# Patient Record
Sex: MALE | Race: WHITE | Employment: OTHER | URBAN - METROPOLITAN AREA
[De-identification: names, ages, dates, MRNs, and addresses within clinical notes are randomized per-mention and may not be internally consistent; named-entity substitution may affect disease eponyms.]

---

## 2017-04-06 ENCOUNTER — TELEPHONE (OUTPATIENT)
Dept: FAMILY MEDICINE | Facility: CLINIC | Age: 67
End: 2017-04-06

## 2017-04-06 DIAGNOSIS — R97.20 ELEVATED PROSTATE SPECIFIC ANTIGEN (PSA): Primary | ICD-10-CM

## 2017-04-06 NOTE — TELEPHONE ENCOUNTER
Reason for Call: Request for an order or referral:    Order or referral being requested: Labs PSA    Date needed: as soon as possible    Has the patient been seen by the PCP for this problem? YES    Additional comments: Patient was told to follow up in 3 months to have his PSA checked.  Patient's spouse is calling to see if the patient can get orders for a blood draw or does the patient need to see Dr Gaona?    Phone number Patient can be reached at:  [phone] (home)            Best Time:  Any    Can we leave a detailed message on this number?  YES    Call taken on 4/6/2017 at 7:24 AM by Ana Paula Vides

## 2017-04-06 NOTE — TELEPHONE ENCOUNTER
Called patient and spoke with his wife. Notified her of PSA lab placed. Patient will come in at 4 pm today.    Kaycee Brand RN  [facility]

## 2017-04-10 DIAGNOSIS — R97.20 ELEVATED PROSTATE SPECIFIC ANTIGEN (PSA): ICD-10-CM

## 2017-04-10 PROCEDURE — G0103 PSA SCREENING: HCPCS | Performed by: PHYSICIAN ASSISTANT

## 2017-04-11 LAB — PSA SERPL-ACNC: 5.54 UG/L (ref 0–4)

## 2017-04-11 NOTE — PROGRESS NOTES
Please call patient. Have him follow up in clinic with Dr. Gaona to discuss further.   Jeanine Little PA-C

## 2017-04-12 ENCOUNTER — TELEPHONE (OUTPATIENT)
Dept: FAMILY MEDICINE | Facility: CLINIC | Age: 67
End: 2017-04-12

## 2017-04-12 NOTE — TELEPHONE ENCOUNTER
PSA 5.54 (H) 0 - 4 ug/L Final 04/10/2017  4:06 PM 59     See message below per provider.    Called patient at [phone] (home) . Left message on voicemail to return phone call to triage.  Ni Weiss RN C[facility] Triage.

## 2017-04-12 NOTE — TELEPHONE ENCOUNTER
Called patient at [phone] (home) and notified him of message below from provider. Patient stated understanding and appointment has been made to see provider.    Kaycee Brand RN  [facility]

## 2017-04-12 NOTE — TELEPHONE ENCOUNTER
Patient returned call to RN line and left message for call back to him at cell [phone].  Jordana Ruiz RN  [facility] [place] [facility]

## 2017-04-21 ENCOUNTER — OFFICE VISIT (OUTPATIENT)
Dept: FAMILY MEDICINE | Facility: CLINIC | Age: 67
End: 2017-04-21
Payer: COMMERCIAL

## 2017-04-21 VITALS
SYSTOLIC BLOOD PRESSURE: 120 MMHG | WEIGHT: 211 LBS | HEIGHT: 69 IN | OXYGEN SATURATION: 96 % | DIASTOLIC BLOOD PRESSURE: 74 MMHG | TEMPERATURE: 97.5 F | BODY MASS INDEX: 31.25 KG/M2 | HEART RATE: 54 BPM

## 2017-04-21 DIAGNOSIS — E78.5 HYPERLIPIDEMIA LDL GOAL <130: ICD-10-CM

## 2017-04-21 DIAGNOSIS — I10 ESSENTIAL HYPERTENSION WITH GOAL BLOOD PRESSURE LESS THAN 140/90: ICD-10-CM

## 2017-04-21 DIAGNOSIS — R97.20 ELEVATED PROSTATE SPECIFIC ANTIGEN (PSA): Primary | ICD-10-CM

## 2017-04-21 PROCEDURE — 99213 OFFICE O/P EST LOW 20 MIN: CPT | Performed by: FAMILY MEDICINE

## 2017-04-21 RX ORDER — LISINOPRIL 20 MG/1
20 TABLET ORAL DAILY
Qty: 90 TABLET | Refills: 3 | Status: CANCELLED | OUTPATIENT
Start: 2017-04-21

## 2017-04-21 RX ORDER — ATORVASTATIN CALCIUM 10 MG/1
10 TABLET, FILM COATED ORAL DAILY
Qty: 90 TABLET | Refills: 3 | Status: CANCELLED | OUTPATIENT
Start: 2017-04-21

## 2017-04-21 RX ORDER — AMLODIPINE BESYLATE 5 MG/1
5 TABLET ORAL DAILY
Qty: 90 TABLET | Refills: 3 | Status: CANCELLED | OUTPATIENT
Start: 2017-04-21

## 2017-04-21 ASSESSMENT — PAIN SCALES - GENERAL: PAINLEVEL: NO PAIN (0)

## 2017-04-21 NOTE — PATIENT INSTRUCTIONS
Stay on same blood pressure meds    [facility] to schedule consult    Bring your labs to the consult

## 2017-04-21 NOTE — PROGRESS NOTES
SUBJECTIVE:                                                    Brian Stephenson is a 67 year old male who presents to clinic today for the following health issues:       Hypertension Follow-up      Outpatient blood pressures are not being checked.    Low Salt Diet: not monitoring salt       Amount of exercise or physical activity: None    Problems taking medications regularly: No    Medication side effects: none    Diet: regular (no restrictions)      Results of PSA test    Problem list and histories reviewed & adjusted, as indicated.  Additional history: as documented         Reviewed and updated as needed this visit by clinical staff       Reviewed and updated as needed this visit by Provider          blood pressure at home is fine, similar to our reading here      Urinating okay    Slight increase in frequency    Sleeps through night    Stream okay        Full physical not done     Mentation and affect are fine    No tremor of speech or extremity    Went over labs in great detail, past and more recent    ASSESSMENT / PLAN:  (R97.20) Elevated prostate specific antigen (PSA)  (primary encounter diagnosis)  Comment: discussed in detail.  He and I agreed that given the accelerating rise in psa, urology eval is appropriate.  [facility] since [facility] is a preferred hospital for his insurance.   Plan: UROLOGY ADULT REFERRAL        Likely he will have an office biopsy done.  Await results.     (I10) Essential hypertension with goal blood pressure less than 140/90  Comment: at goal   Plan: continue same meds     (E78.5) Hyperlipidemia LDL goal <130  Comment: on statin  Plan: last LDL was fine; continue med       I reviewed the patient's medications, allergies, medical history, family history, and social history.    Amilcar Gaona MD

## 2017-04-21 NOTE — NURSING NOTE
"Chief Complaint   Patient presents with     Hypertension     Health Maintenance       Initial /74 (BP Location: Right arm, Patient Position: Chair, Cuff Size: Adult Regular)  Pulse 54  Temp 97.5  F (36.4  C) (Oral)  Ht 5' 8.5\" (1.74 m)  Wt 211 lb (95.7 kg)  SpO2 96%  BMI 31.62 kg/m2 Estimated body mass index is 31.62 kg/(m^2) as calculated from the following:    Height as of this encounter: 5' 8.5\" (1.74 m).    Weight as of this encounter: 211 lb (95.7 kg).  Medication Reconciliation: complete   Kecia Lind CMA      "

## 2017-04-21 NOTE — MR AVS SNAPSHOT
After Visit Summary   4/21/2017    Brian Stephenson    MRN: [id number]           Patient Information     Date Of Birth          1950        Visit Information        Provider Department      4/21/2017 4:00 PM Amilcar Gaona MD [facility]        Today's Diagnoses     Elevated prostate specific antigen (PSA)    -  1    Essential hypertension with goal blood pressure less than 140/90        Hyperlipidemia LDL goal <130          Care Instructions    Stay on same blood pressure meds    [facility] to schedule consult    Bring your labs to the consult          Follow-ups after your visit        Additional Services     UROLOGY ADULT REFERRAL       Your provider has referred you to: patient needs to see urology.  He would like to see a urologist that works at [facility]    Please be aware that coverage of these services is subject to the terms and limitations of your health insurance plan.  Call member services at your health plan with any benefit or coverage questions.      Please bring the following with you to your appointment:    (1) Any X-Rays, CTs or MRIs which have been performed.  Contact the facility where they were done to arrange for  prior to your scheduled appointment.    (2) List of current medications  (3) This referral request   (4) Any documents/labs given to you for this referral                  Who to contact     If you have questions or need follow up information about today's clinic visit or your schedule please contact [facility] directly at [phone].  Normal or non-critical lab and imaging results will be communicated to you by MyChart, letter or phone within 4 business days after the clinic has received the results. If you do not hear from us within 7 days, please contact the clinic through MyChart or phone. If you have a critical or abnormal lab result, we will notify you by phone  "as soon as possible.  Submit refill requests through [employer] or call your pharmacy and they will forward the refill request to us. Please allow 3 business days for your refill to be completed.          Additional Information About Your Visit        [employer] Information     [employer] lets you send messages to your doctor, view your test results, renew your prescriptions, schedule appointments and more. To sign up, go to [email or web address] . Click on \"Log in\" on the left side of the screen, which will take you to the Welcome page. Then click on \"Sign up Now\" on the right side of the page.     You will be asked to enter the access code listed below, as well as some personal information. Please follow the directions to create your username and password.     Your access code is: [email or web address]  Expires: 2017  4:26 PM     Your access code will  in 90 days. If you need help or a new code, please call your [facility] clinic or [phone].        Care EveryWhere ID     This is your Care EveryWhere ID. This could be used by other organizations to access your [facility] medical records  [id number]        Your Vitals Were     Pulse Temperature Height Pulse Oximetry BMI (Body Mass Index)       54 97.5  F (36.4  C) (Oral) 5' 8.5\" (1.74 m) 96% 31.62 kg/m2        Blood Pressure from Last 3 Encounters:   17 120/74   16 117/75   16 120/72    Weight from Last 3 Encounters:   17 211 lb (95.7 kg)   16 214 lb (97.1 kg)   16 207 lb (93.9 kg)              We Performed the Following     UROLOGY ADULT REFERRAL        Primary Care Provider Office Phone # Fax #    Amilcar Gaona [phone]       [facility] [address] [facility] [address]        Thank you!     Thank you for choosing [facility]  for your care. Our goal is always to provide you with excellent care. Hearing back from our patients is one way we can continue to " improve our services. Please take a few minutes to complete the written survey that you may receive in the mail after your visit with us. Thank you!             Your Updated Medication List - Protect others around you: Learn how to safely use, store and throw away your medicines at www.disposemymeds.org.          This list is accurate as of: 4/21/17  4:26 PM.  Always use your most recent med list.                   Brand Name Dispense Instructions for use    amLODIPine 5 MG tablet    NORVASC    90 tablet    Take 1 tablet (5 mg) by mouth daily       aspirin 325 MG EC tablet      1 TABLET DAILY       atorvastatin 10 MG tablet    LIPITOR    90 tablet    Take 1 tablet (10 mg) by mouth daily       DAILY MULTIVITAMIN PO      1 DAILY       lisinopril 20 MG tablet    PRINIVIL/ZESTRIL    90 tablet    Take 1 tablet (20 mg) by mouth daily

## 2017-05-10 ENCOUNTER — TRANSFERRED RECORDS (OUTPATIENT)
Dept: HEALTH INFORMATION MANAGEMENT | Facility: CLINIC | Age: 67
End: 2017-05-10

## 2017-07-05 DIAGNOSIS — I10 ESSENTIAL HYPERTENSION WITH GOAL BLOOD PRESSURE LESS THAN 140/90: ICD-10-CM

## 2017-07-05 NOTE — TELEPHONE ENCOUNTER
amLODIPine (NORVASC) 5 MG tablet      Last Written Prescription Date: 7-22-16  Last Fill Quantity: 90, # refills: 3    Last Office Visit with [facility] or [facility] prescribing provider:  4-2-17   Future Office Visit:        BP Readings from Last 3 Encounters:   04/21/17 120/74   07/22/16 117/75   01/08/16 120/72     lisinopril (PRINIVIL,ZESTRIL) 20 MG tablet      Last Written Prescription Date: 7-22-16  Last Fill Quantity: 90, # refills: 3  Last Office Visit with [facility], [facility] or [facility] prescribing provider: 4-21-17       Potassium   Date Value Ref Range Status   10/10/2016 3.8 3.4 - 5.3 mmol/L Final     Creatinine   Date Value Ref Range Status   10/10/2016 0.75 0.66 - 1.25 mg/dL Final     BP Readings from Last 3 Encounters:   04/21/17 120/74   07/22/16 117/75   01/08/16 120/72

## 2017-07-07 RX ORDER — AMLODIPINE BESYLATE 5 MG/1
TABLET ORAL
Qty: 90 TABLET | Refills: 0 | Status: SHIPPED | OUTPATIENT
Start: 2017-07-07 | End: 2017-09-22

## 2017-07-07 RX ORDER — LISINOPRIL 20 MG/1
TABLET ORAL
Qty: 90 TABLET | Refills: 0 | Status: SHIPPED | OUTPATIENT
Start: 2017-07-07 | End: 2017-09-22

## 2017-07-07 NOTE — TELEPHONE ENCOUNTER
Prescription approved per [facility] Refill Protocol.     Berkley Krause RN

## 2017-07-28 ENCOUNTER — TRANSFERRED RECORDS (OUTPATIENT)
Dept: HEALTH INFORMATION MANAGEMENT | Facility: CLINIC | Age: 67
End: 2017-07-28

## 2017-08-03 ENCOUNTER — TRANSFERRED RECORDS (OUTPATIENT)
Dept: HEALTH INFORMATION MANAGEMENT | Facility: CLINIC | Age: 67
End: 2017-08-03

## 2017-08-17 ENCOUNTER — TRANSFERRED RECORDS (OUTPATIENT)
Dept: HEALTH INFORMATION MANAGEMENT | Facility: CLINIC | Age: 67
End: 2017-08-17

## 2017-09-22 ENCOUNTER — OFFICE VISIT (OUTPATIENT)
Dept: FAMILY MEDICINE | Facility: CLINIC | Age: 67
End: 2017-09-22
Payer: COMMERCIAL

## 2017-09-22 VITALS
WEIGHT: 208.38 LBS | DIASTOLIC BLOOD PRESSURE: 79 MMHG | BODY MASS INDEX: 31.22 KG/M2 | HEART RATE: 44 BPM | SYSTOLIC BLOOD PRESSURE: 120 MMHG | OXYGEN SATURATION: 98 % | TEMPERATURE: 97.8 F

## 2017-09-22 DIAGNOSIS — Z00.01 ENCOUNTER FOR PREVENTATIVE ADULT HEALTH CARE EXAM WITH ABNORMAL FINDINGS: Primary | ICD-10-CM

## 2017-09-22 DIAGNOSIS — R53.83 FATIGUE, UNSPECIFIED TYPE: ICD-10-CM

## 2017-09-22 DIAGNOSIS — Z12.2 ENCOUNTER FOR SCREENING FOR LUNG CANCER: ICD-10-CM

## 2017-09-22 DIAGNOSIS — E78.5 HYPERLIPIDEMIA LDL GOAL <130: ICD-10-CM

## 2017-09-22 DIAGNOSIS — Z23 NEED FOR PROPHYLACTIC VACCINATION AND INOCULATION AGAINST INFLUENZA: ICD-10-CM

## 2017-09-22 DIAGNOSIS — Z91.89 PNEUMOCOCCAL VACCINATION INDICATED: ICD-10-CM

## 2017-09-22 DIAGNOSIS — I10 ESSENTIAL HYPERTENSION WITH GOAL BLOOD PRESSURE LESS THAN 140/90: ICD-10-CM

## 2017-09-22 DIAGNOSIS — C61 PROSTATE CANCER (H): ICD-10-CM

## 2017-09-22 DIAGNOSIS — R73.01 IMPAIRED FASTING GLUCOSE: ICD-10-CM

## 2017-09-22 LAB
ALBUMIN SERPL-MCNC: 3.9 G/DL (ref 3.4–5)
ALP SERPL-CCNC: 82 U/L (ref 40–150)
ALT SERPL W P-5'-P-CCNC: 31 U/L (ref 0–70)
ANION GAP SERPL CALCULATED.3IONS-SCNC: 9 MMOL/L (ref 3–14)
AST SERPL W P-5'-P-CCNC: 22 U/L (ref 0–45)
BASOPHILS # BLD AUTO: 0.1 10E9/L (ref 0–0.2)
BASOPHILS NFR BLD AUTO: 1.8 %
BILIRUB SERPL-MCNC: 1.2 MG/DL (ref 0.2–1.3)
BUN SERPL-MCNC: 11 MG/DL (ref 7–30)
CALCIUM SERPL-MCNC: 9.2 MG/DL (ref 8.5–10.1)
CHLORIDE SERPL-SCNC: 105 MMOL/L (ref 94–109)
CHOLEST SERPL-MCNC: 182 MG/DL
CO2 SERPL-SCNC: 27 MMOL/L (ref 20–32)
CREAT SERPL-MCNC: 0.8 MG/DL (ref 0.66–1.25)
DIFFERENTIAL METHOD BLD: NORMAL
EOSINOPHIL # BLD AUTO: 0.2 10E9/L (ref 0–0.7)
EOSINOPHIL NFR BLD AUTO: 3.9 %
ERYTHROCYTE [DISTWIDTH] IN BLOOD BY AUTOMATED COUNT: 14.4 % (ref 10–15)
GFR SERPL CREATININE-BSD FRML MDRD: >90 ML/MIN/1.7M2
GLUCOSE SERPL-MCNC: 99 MG/DL (ref 70–99)
HBA1C MFR BLD: 5.5 % (ref 4.3–6)
HCT VFR BLD AUTO: 45.1 % (ref 40–53)
HDLC SERPL-MCNC: 65 MG/DL
HGB BLD-MCNC: 15.4 G/DL (ref 13.3–17.7)
LDLC SERPL CALC-MCNC: 101 MG/DL
LYMPHOCYTES # BLD AUTO: 1.3 10E9/L (ref 0.8–5.3)
LYMPHOCYTES NFR BLD AUTO: 21 %
MCH RBC QN AUTO: 30.6 PG (ref 26.5–33)
MCHC RBC AUTO-ENTMCNC: 34.1 G/DL (ref 31.5–36.5)
MCV RBC AUTO: 90 FL (ref 78–100)
MONOCYTES # BLD AUTO: 0.6 10E9/L (ref 0–1.3)
MONOCYTES NFR BLD AUTO: 10.2 %
NEUTROPHILS # BLD AUTO: 3.8 10E9/L (ref 1.6–8.3)
NEUTROPHILS NFR BLD AUTO: 63.1 %
NONHDLC SERPL-MCNC: 117 MG/DL
PLATELET # BLD AUTO: 243 10E9/L (ref 150–450)
POTASSIUM SERPL-SCNC: 4.8 MMOL/L (ref 3.4–5.3)
PROT SERPL-MCNC: 7.4 G/DL (ref 6.8–8.8)
RBC # BLD AUTO: 5.04 10E12/L (ref 4.4–5.9)
SODIUM SERPL-SCNC: 141 MMOL/L (ref 133–144)
TRIGL SERPL-MCNC: 81 MG/DL
TSH SERPL DL<=0.005 MIU/L-ACNC: 1.09 MU/L (ref 0.4–4)
WBC # BLD AUTO: 6 10E9/L (ref 4–11)

## 2017-09-22 PROCEDURE — 83036 HEMOGLOBIN GLYCOSYLATED A1C: CPT | Performed by: FAMILY MEDICINE

## 2017-09-22 PROCEDURE — 36415 COLL VENOUS BLD VENIPUNCTURE: CPT | Performed by: FAMILY MEDICINE

## 2017-09-22 PROCEDURE — 90472 IMMUNIZATION ADMIN EACH ADD: CPT | Performed by: FAMILY MEDICINE

## 2017-09-22 PROCEDURE — 90662 IIV NO PRSV INCREASED AG IM: CPT | Performed by: FAMILY MEDICINE

## 2017-09-22 PROCEDURE — 90471 IMMUNIZATION ADMIN: CPT | Performed by: FAMILY MEDICINE

## 2017-09-22 PROCEDURE — 90732 PPSV23 VACC 2 YRS+ SUBQ/IM: CPT | Performed by: FAMILY MEDICINE

## 2017-09-22 PROCEDURE — 99397 PER PM REEVAL EST PAT 65+ YR: CPT | Mod: 25 | Performed by: FAMILY MEDICINE

## 2017-09-22 PROCEDURE — 80061 LIPID PANEL: CPT | Performed by: FAMILY MEDICINE

## 2017-09-22 PROCEDURE — 80050 GENERAL HEALTH PANEL: CPT | Performed by: FAMILY MEDICINE

## 2017-09-22 RX ORDER — ATORVASTATIN CALCIUM 10 MG/1
10 TABLET, FILM COATED ORAL DAILY
Qty: 90 TABLET | Refills: 3 | Status: SHIPPED | OUTPATIENT
Start: 2017-09-22 | End: 2018-09-24

## 2017-09-22 RX ORDER — AMLODIPINE BESYLATE 5 MG/1
TABLET ORAL
Qty: 90 TABLET | Refills: 3 | Status: SHIPPED | OUTPATIENT
Start: 2017-09-22 | End: 2018-09-24

## 2017-09-22 RX ORDER — LISINOPRIL 20 MG/1
TABLET ORAL
Qty: 90 TABLET | Refills: 3 | Status: SHIPPED | OUTPATIENT
Start: 2017-09-22 | End: 2018-08-30

## 2017-09-22 ASSESSMENT — PAIN SCALES - GENERAL: PAINLEVEL: NO PAIN (0)

## 2017-09-22 NOTE — NURSING NOTE
"Chief Complaint   Patient presents with     Wellness Visit     Health Maintenance       Initial /79 (BP Location: Left arm, Patient Position: Chair, Cuff Size: Adult Regular)  Pulse (!) 44  Temp 97.8  F (36.6  C) (Oral)  Wt 208 lb 6 oz (94.5 kg)  SpO2 98%  BMI 31.22 kg/m2 Estimated body mass index is 31.22 kg/(m^2) as calculated from the following:    Height as of 4/21/17: 5' 8.5\" (1.74 m).    Weight as of this encounter: 208 lb 6 oz (94.5 kg).  Medication Reconciliation: complete   Kecia Lind CMA      "

## 2017-09-22 NOTE — MR AVS SNAPSHOT
After Visit Summary   9/22/2017    Brian Stephenson    MRN: [id number]           Patient Information     Date Of Birth          1950        Visit Information        Provider Department      9/22/2017 10:20 AM Amilcar Gaona MD [facility]        Today's Diagnoses     Pneumococcal vaccination indicated    -  1    Essential hypertension with goal blood pressure less than 140/90        Hyperlipidemia LDL goal <130        Encounter for screening for lung cancer        Fatigue, unspecified type        Impaired fasting glucose          Care Instructions    Schedule CT scan    Stay on same medications for now    Keep working on healthy diet/exercise and wt loss    We will send you lab results    See us for a preop a couple weeks prior to surgery           Follow-ups after your visit        Future tests that were ordered for you today     Open Future Orders        Priority Expected Expires Ordered    CT Chest Lung Cancer Scrn Low Dose wo Routine  9/22/2018 9/22/2017            Who to contact     If you have questions or need follow up information about today's clinic visit or your schedule please contact [facility] directly at [phone].  Normal or non-critical lab and imaging results will be communicated to you by [employer], letter or phone within 4 business days after the clinic has received the results. If you do not hear from us within 7 days, please contact the clinic through [employer] or phone. If you have a critical or abnormal lab result, we will notify you by phone as soon as possible.  Submit refill requests through [employer] or call your pharmacy and they will forward the refill request to us. Please allow 3 business days for your refill to be completed.          Additional Information About Your Visit        [employer] Information     [employer] lets you send messages to your doctor, view your test results, renew your prescriptions, schedule appointments and  "more. To sign up, go to [email or web address] . Click on \"Log in\" on the left side of the screen, which will take you to the Welcome page. Then click on \"Sign up Now\" on the right side of the page.     You will be asked to enter the access code listed below, as well as some personal information. Please follow the directions to create your username and password.     Your access code is: [email or web address]  Expires: 2017 10:48 AM     Your access code will  in 90 days. If you need help or a new code, please call your [facility] clinic or [phone].        Care EveryWhere ID     This is your Care EveryWhere ID. This could be used by other organizations to access your [facility] medical records  [id number]        Your Vitals Were     Pulse Temperature Pulse Oximetry BMI (Body Mass Index)          44 97.8  F (36.6  C) (Oral) 98% 31.22 kg/m2         Blood Pressure from Last 3 Encounters:   17 120/79   17 120/74   16 117/75    Weight from Last 3 Encounters:   17 208 lb 6 oz (94.5 kg)   17 211 lb (95.7 kg)   16 214 lb (97.1 kg)              We Performed the Following     CBC with platelets differential     Comprehensive metabolic panel     Hemoglobin A1c     Lipid panel reflex to direct LDL     PNEUMOCOCCAL VACCINE,ADULT,SQ OR IM     TSH with free T4 reflex     VACCINE ADMINISTRATION, INITIAL          Today's Medication Changes          These changes are accurate as of: 17 10:49 AM.  If you have any questions, ask your nurse or doctor.               These medicines have changed or have updated prescriptions.        Dose/Directions    amLODIPine 5 MG tablet   Commonly known as:  NORVASC   This may have changed:  See the new instructions.   Used for:  Essential hypertension with goal blood pressure less than 140/90   Changed by:  Amilcar Gaona MD        TAKE 1 TABLET(5 MG) BY MOUTH DAILY   Quantity:  90 tablet   Refills:  3       lisinopril 20 MG tablet   Commonly known as: "  PRINIVIL/ZESTRIL   This may have changed:  See the new instructions.   Used for:  Essential hypertension with goal blood pressure less than 140/90   Changed by:  Amilcar Gaona MD        TAKE 1 TABLET(20 MG) BY MOUTH DAILY   Quantity:  90 tablet   Refills:  3            Where to get your medicines      These medications were sent to [id number] - [place], [place] - [address] AT [facility] [address]  [address], [place] [address]     Phone:  [phone]     amLODIPine 5 MG tablet    atorvastatin 10 MG tablet    lisinopril 20 MG tablet                Primary Care Provider Office Phone # Fax #    Amilcar Gaona [phone] [address] [facility] [address]        Equal Access to Services     MAC SINGER : Hadii aad ku hadasho Soomaali, waaxda luqadaha, qaybta kaalmada adeegyada, waxay ashain hayvaishali nation . So [facility] [phone].    ATENCIÓN: Si habla español, tiene a meadows disposición servicios gratuitos de asistencia lingüística. Llame al [phone].    We comply with applicable federal civil rights laws and [place] laws. We do not discriminate on the basis of race, color, national origin, age, disability sex, sexual orientation or gender identity.            Thank you!     Thank you for choosing [facility]  for your care. Our goal is always to provide you with excellent care. Hearing back from our patients is one way we can continue to improve our services. Please take a few minutes to complete the written survey that you may receive in the mail after your visit with us. Thank you!             Your Updated Medication List - Protect others around you: Learn how to safely use, store and throw away your medicines at www.disposemymeds.org.          This list is accurate as of: 9/22/17 10:49 AM.  Always use your most recent med list.                   Brand Name Dispense Instructions  for use Diagnosis    amLODIPine 5 MG tablet    NORVASC    90 tablet    TAKE 1 TABLET(5 MG) BY MOUTH DAILY    Essential hypertension with goal blood pressure less than 140/90       aspirin 325 MG EC tablet      1 TABLET DAILY        atorvastatin 10 MG tablet    LIPITOR    90 tablet    Take 1 tablet (10 mg) by mouth daily    Hyperlipidemia LDL goal <130       DAILY MULTIVITAMIN PO      1 DAILY        lisinopril 20 MG tablet    PRINIVIL/ZESTRIL    90 tablet    TAKE 1 TABLET(20 MG) BY MOUTH DAILY    Essential hypertension with goal blood pressure less than 140/90

## 2017-09-22 NOTE — PATIENT INSTRUCTIONS
Schedule CT scan    Stay on same medications for now    Keep working on healthy diet/exercise and wt loss    We will send you lab results    See us for a preop a couple weeks prior to surgery

## 2017-09-22 NOTE — LETTER
[facility]   [address] [place]  [address]  [phone]                                   September 25, 2017    Brian Stephenson  [address]   [place] [address]        Dear Brian,    Your labs are all okay.     Results for orders placed or performed in visit on 09/22/17   CBC with platelets differential   Result Value Ref Range    WBC 6.0 4.0 - 11.0 10e9/L    RBC Count 5.04 4.4 - 5.9 10e12/L    Hemoglobin 15.4 13.3 - 17.7 g/dL    Hematocrit 45.1 40.0 - 53.0 %    MCV 90 78 - 100 fl    MCH 30.6 26.5 - 33.0 pg    MCHC 34.1 31.5 - 36.5 g/dL    RDW 14.4 10.0 - 15.0 %    Platelet Count 243 150 - 450 10e9/L    Diff Method Automated Method     % Neutrophils 63.1 %    % Lymphocytes 21.0 %    % Monocytes 10.2 %    % Eosinophils 3.9 %    % Basophils 1.8 %    Absolute Neutrophil 3.8 1.6 - 8.3 10e9/L    Absolute Lymphocytes 1.3 0.8 - 5.3 10e9/L    Absolute Monocytes 0.6 0.0 - 1.3 10e9/L    Absolute Eosinophils 0.2 0.0 - 0.7 10e9/L    Absolute Basophils 0.1 0.0 - 0.2 10e9/L   Comprehensive metabolic panel   Result Value Ref Range    Sodium 141 133 - 144 mmol/L    Potassium 4.8 3.4 - 5.3 mmol/L    Chloride 105 94 - 109 mmol/L    Carbon Dioxide 27 20 - 32 mmol/L    Anion Gap 9 3 - 14 mmol/L    Glucose 99 70 - 99 mg/dL    Urea Nitrogen 11 7 - 30 mg/dL    Creatinine 0.80 0.66 - 1.25 mg/dL    GFR Estimate >90 >60 mL/min/1.7m2    GFR Estimate If Black >90 >60 mL/min/1.7m2    Calcium 9.2 8.5 - 10.1 mg/dL    Bilirubin Total 1.2 0.2 - 1.3 mg/dL    Albumin 3.9 3.4 - 5.0 g/dL    Protein Total 7.4 6.8 - 8.8 g/dL    Alkaline Phosphatase 82 40 - 150 U/L    ALT 31 0 - 70 U/L    AST 22 0 - 45 U/L   TSH with free T4 reflex   Result Value Ref Range    TSH 1.09 0.40 - 4.00 mU/L   Hemoglobin A1c   Result Value Ref Range    Hemoglobin A1C 5.5 4.3 - 6.0 %   Lipid panel reflex to direct LDL   Result Value Ref Range    Cholesterol 182 <200 mg/dL    Triglycerides 81 <150 mg/dL    HDL Cholesterol 65 >39 mg/dL     LDL Cholesterol Calculated 101 (H) <100 mg/dL    Non HDL Cholesterol 117 <130 mg/dL       If you have any questions please call the clinic at [phone]    Sincerely,    Amilcar Gaona MD  bmd

## 2017-09-22 NOTE — PROGRESS NOTES
SUBJECTIVE:   CC: Brian Stephenson is an 67 year old male who presents for preventative health visit.     Physical   Annual:     Getting at least 3 servings of Calcium per day::  NO    Bi-annual eye exam::  NO    Dental care twice a year::  Yes    Sleep apnea or symptoms of sleep apnea::  None    Diet::  Regular (no restrictions)    Frequency of exercise::  None    Taking medications regularly::  Yes    Medication side effects::  None    Additional concerns today::  No            Patient to have prostatectomy in Dec    Dx with prostate ca    See urology notes         Today's PHQ-2 Score:   PHQ-2 ( 1999 Pfizer) 9/22/2017   Q1: Little interest or pleasure in doing things 0   Q2: Feeling down, depressed or hopeless 0   PHQ-2 Score 0   Q1: Little interest or pleasure in doing things Not at all   Q2: Feeling down, depressed or hopeless Not at all   PHQ-2 Score 0       Abuse: Current or Past(Physical, Sexual or Emotional)- No  Do you feel safe in your environment - Yes    Social History   Substance Use Topics     Smoking status: Former Smoker     Quit date: 1/1/1997     Smokeless tobacco: Never Used     Alcohol use Yes      Comment: rare     The patient does not drink >3 drinks per day nor >7 drinks per week.    Last PSA:   PSA   Date Value Ref Range Status   04/10/2017 5.54 (H) 0 - 4 ug/L Final     Comment:     Assay Method:  Chemiluminescence using Siemens Vista analyzer       Reviewed orders with patient. Reviewed health maintenance and updated orders accordingly - Yes       Reviewed and updated as needed this visit by clinical staff  Tobacco  Allergies  Meds  Problems  Med Hx  Surg Hx  Fam Hx  Soc Hx          Reviewed and updated as needed this visit by Provider              ROS:  C: NEGATIVE for fever, chills, change in weight  I: NEGATIVE for worrisome rashes, moles or lesions  E: NEGATIVE for vision changes or irritation  ENT: NEGATIVE for ear, mouth and throat problems  R: NEGATIVE for significant cough or  SOB  CV: NEGATIVE for chest pain, palpitations or peripheral edema  GI: NEGATIVE for nausea, abdominal pain, heartburn, or change in bowel habits   male: negative for dysuria, hematuria, decreased urinary stream, erectile dysfunction, urethral discharge  M: NEGATIVE for significant arthralgias or myalgia  N: NEGATIVE for weakness, dizziness or paresthesias    OBJECTIVE:   /79 (BP Location: Left arm, Patient Position: Chair, Cuff Size: Adult Regular)  Pulse (!) 44  Temp 97.8  F (36.6  C) (Oral)  Wt 208 lb 6 oz (94.5 kg)  SpO2 98%  BMI 31.22 kg/m2    EXAM:  GENERAL: healthy, alert and no distress  HENT: ear canals and TM's normal, nose and mouth without ulcers or lesions  NECK: no adenopathy, no asymmetry, masses, or scars and thyroid normal to palpation  RESP: lungs clear to auscultation - no rales, rhonchi or wheezes  CV: regular rate and rhythm, normal S1 S2, no S3 or S4, no murmur, click or rub, no peripheral edema and peripheral pulses strong  ABDOMEN: soft, nontender, no hepatosplenomegaly, no masses and bowel sounds normal  MS: no gross musculoskeletal defects noted, no edema  SKIN: no suspicious lesions or rashes  NEURO: Normal strength and tone, mentation intact and speech normal  PSYCH: mentation appears normal, affect normal/bright    ASSESSMENT/PLAN:   Brian was seen today for wellness visit and health maintenance.    Diagnoses and all orders for this visit:    Encounter for preventative adult health care exam with abnormal findings    Prostate cancer (H)    Essential hypertension with goal blood pressure less than 140/90  -     amLODIPine (NORVASC) 5 MG tablet; TAKE 1 TABLET(5 MG) BY MOUTH DAILY  -     lisinopril (PRINIVIL/ZESTRIL) 20 MG tablet; TAKE 1 TABLET(20 MG) BY MOUTH DAILY    Hyperlipidemia LDL goal <130  -     atorvastatin (LIPITOR) 10 MG tablet; Take 1 tablet (10 mg) by mouth daily  -     Comprehensive metabolic panel  -     Lipid panel reflex to direct LDL    Pneumococcal  "vaccination indicated  -     VACCINE ADMINISTRATION, INITIAL  -     PNEUMOCOCCAL VACCINE,ADULT,SQ OR IM    Encounter for screening for lung cancer  -     CT Chest Lung Cancer Scrn Low Dose wo; Future    Fatigue, unspecified type  -     CBC with platelets differential  -     TSH with free T4 reflex    Impaired fasting glucose  -     Hemoglobin A1c    Need for prophylactic vaccination and inoculation against influenza  -     FLU VACCINE, INCREASED ANTIGEN, PRESV FREE, AGE 65+ [95128]  -     Vaccine Administration, Each Additional [05052]    Discussed multiple issues with patient  Pneumovax and flu shots given  Check labs fasting  Refill meds  Patient to see us 2-3 weeks prior to prostate surgery for preop  Patient to call and schedule CT to screen lung cancer given the past smoking    COUNSELING:   Reviewed preventive health counseling, as reflected in patient instructions       Regular exercise       Healthy diet/nutrition       Vision screening       Safe sex practices/STD prevention       Colon cancer screening       Prostate cancer screening            reports that he quit smoking about 20 years ago. He has never used smokeless tobacco.      Estimated body mass index is 31.22 kg/(m^2) as calculated from the following:    Height as of 4/21/17: 5' 8.5\" (1.74 m).    Weight as of this encounter: 208 lb 6 oz (94.5 kg).   Weight management plan: Discussed healthy diet and exercise guidelines and patient will follow up in 12 months in clinic to re-evaluate.    Counseling Resources:  ATP IV Guidelines  Pooled Cohorts Equation Calculator  FRAX Risk Assessment  ICSI Preventive Guidelines  Dietary Guidelines for Americans, 2010  USDA's MyPlate  ASA Prophylaxis  Lung CA Screening    Amilcar Gaona MD  [facility]  Answers for HPI/ROS submitted by the patient on 9/22/2017   PHQ-2 Score: 0    "

## 2017-09-22 NOTE — PROGRESS NOTES
Injectable Influenza Immunization Documentation    1.  Is the person to be vaccinated sick today?   No    2. Does the person to be vaccinated have an allergy to a component   of the vaccine?   No    3. Has the person to be vaccinated ever had a serious reaction   to influenza vaccine in the past?   No    4. Has the person to be vaccinated ever had Guillain-Barré syndrome?   No    Form completed by Kecia Lind CMA

## 2017-10-03 ENCOUNTER — RADIANT APPOINTMENT (OUTPATIENT)
Dept: CT IMAGING | Facility: CLINIC | Age: 67
End: 2017-10-03
Attending: FAMILY MEDICINE
Payer: COMMERCIAL

## 2017-10-03 DIAGNOSIS — Z12.2 ENCOUNTER FOR SCREENING FOR LUNG CANCER: ICD-10-CM

## 2017-10-03 PROCEDURE — G0297 LDCT FOR LUNG CA SCREEN: HCPCS | Mod: TC

## 2017-10-03 NOTE — LETTER
"[facility]   [address] [place]  [address]  [phone]                                   October 6, 2017    Brian Stephenson  [address]  [place] [address]        Dear Brian,    Here is the CT report I called you about.    Advise repeat in one year.  Remind us at that time and we can reorder the test.    Results for orders placed or performed in visit on 10/03/17   CT Chest Lung Cancer Scrn Low Dose wo    Narrative    CT CHEST WITHOUT CONTRAST - LUNG CANCER SCREENING  10/3/2017 5:09 PM    HISTORY: Former smoker. Lung cancer screening.    TECHNIQUE: Without intravenous contrast, helical sections were  acquired through the lungs using the low-dose lung cancer screening  protocol. Coronal reconstructions were generated.    COMPARISON: None.    FINDINGS:  Lungs: Minimal emphysematous changes in the lungs. 1.0 x 0.9 cm  groundglass nodular opacity in the superior segment of the right lower  lobe (series 3 image 170). A few linear opacities in the periphery of  lung bases likely represents scarring and/or atelectasis.    Additional findings: A few mildly enlarged paratracheal,  aorticopulmonary window, and subcarinal lymph nodes. For example,  there are a 2.5 x 1.9 cm precarinal lymph node (series 2 image 111)  and a 2.4 x 1.3 cm aorticopulmonary window lymph node (series 2 image  132). Atherosclerotic calcification in the thoracic aorta. Prior  cholecystectomy.      Impression    IMPRESSION:  1. ACR Assessment Category: 1.0 cm groundglass nodular opacity in the  right lower lobe. Lung-RADS Category 2. Benign appearance or behavior.  Recommendation: Continue annual screening.   2. Significant Incidental Finding(s): Several nonspecific mildly  enlarged mediastinal lymph nodes. Comparison with prior imaging  studies would be useful, if available.    Download the \"LungRADS Assessment Categories\" table at this site:  http://www.acr.org/Quality-Safety/Resources/LungRADS    PAL " MD SUNNY       If you have any questions please call the clinic at [phone]    Sincerely,    Amilcar Gaona MD  bmd

## 2017-10-05 ENCOUNTER — TELEPHONE (OUTPATIENT)
Dept: FAMILY MEDICINE | Facility: CLINIC | Age: 67
End: 2017-10-05

## 2017-10-06 NOTE — TELEPHONE ENCOUNTER
I called patient and discussed in detail CT result.  Advise repeat in one year.  Amilcar Gaona MD

## 2017-10-06 NOTE — PROGRESS NOTES
Here is the CT report I called you about.    Advise repeat in one year.  Remind us at that time and we can reorder the test.    Amilcar Gaona MD

## 2017-12-08 ENCOUNTER — OFFICE VISIT (OUTPATIENT)
Dept: FAMILY MEDICINE | Facility: CLINIC | Age: 67
End: 2017-12-08
Payer: COMMERCIAL

## 2017-12-08 VITALS
SYSTOLIC BLOOD PRESSURE: 137 MMHG | DIASTOLIC BLOOD PRESSURE: 87 MMHG | TEMPERATURE: 98 F | HEART RATE: 68 BPM | OXYGEN SATURATION: 96 % | WEIGHT: 208 LBS | BODY MASS INDEX: 31.17 KG/M2

## 2017-12-08 DIAGNOSIS — B02.9 HERPES ZOSTER WITHOUT COMPLICATION: ICD-10-CM

## 2017-12-08 DIAGNOSIS — Z01.818 PREOP GENERAL PHYSICAL EXAM: Primary | ICD-10-CM

## 2017-12-08 DIAGNOSIS — Z01.818 PRE-OP EXAM: Primary | ICD-10-CM

## 2017-12-08 DIAGNOSIS — C61 PROSTATE CANCER (H): ICD-10-CM

## 2017-12-08 LAB
BASOPHILS # BLD AUTO: 0.1 10E9/L (ref 0–0.2)
BASOPHILS NFR BLD AUTO: 1.6 %
DIFFERENTIAL METHOD BLD: NORMAL
EOSINOPHIL # BLD AUTO: 0.3 10E9/L (ref 0–0.7)
EOSINOPHIL NFR BLD AUTO: 3.4 %
ERYTHROCYTE [DISTWIDTH] IN BLOOD BY AUTOMATED COUNT: 14.1 % (ref 10–15)
HCT VFR BLD AUTO: 48.2 % (ref 40–53)
HGB BLD-MCNC: 16.5 G/DL (ref 13.3–17.7)
LYMPHOCYTES # BLD AUTO: 1.4 10E9/L (ref 0.8–5.3)
LYMPHOCYTES NFR BLD AUTO: 17.7 %
MCH RBC QN AUTO: 31.1 PG (ref 26.5–33)
MCHC RBC AUTO-ENTMCNC: 34.2 G/DL (ref 31.5–36.5)
MCV RBC AUTO: 91 FL (ref 78–100)
MONOCYTES # BLD AUTO: 0.9 10E9/L (ref 0–1.3)
MONOCYTES NFR BLD AUTO: 12.1 %
NEUTROPHILS # BLD AUTO: 5 10E9/L (ref 1.6–8.3)
NEUTROPHILS NFR BLD AUTO: 65.2 %
PLATELET # BLD AUTO: 257 10E9/L (ref 150–450)
RBC # BLD AUTO: 5.3 10E12/L (ref 4.4–5.9)
WBC # BLD AUTO: 7.6 10E9/L (ref 4–11)

## 2017-12-08 PROCEDURE — 82565 ASSAY OF CREATININE: CPT | Performed by: FAMILY MEDICINE

## 2017-12-08 PROCEDURE — 99215 OFFICE O/P EST HI 40 MIN: CPT | Performed by: FAMILY MEDICINE

## 2017-12-08 PROCEDURE — 93010 ELECTROCARDIOGRAM REPORT: CPT | Performed by: INTERNAL MEDICINE

## 2017-12-08 PROCEDURE — 85025 COMPLETE CBC W/AUTO DIFF WBC: CPT | Performed by: FAMILY MEDICINE

## 2017-12-08 PROCEDURE — 93005 ELECTROCARDIOGRAM TRACING: CPT | Performed by: FAMILY MEDICINE

## 2017-12-08 PROCEDURE — 84132 ASSAY OF SERUM POTASSIUM: CPT | Performed by: FAMILY MEDICINE

## 2017-12-08 PROCEDURE — 36415 COLL VENOUS BLD VENIPUNCTURE: CPT | Performed by: FAMILY MEDICINE

## 2017-12-08 RX ORDER — ACYCLOVIR 800 MG/1
800 TABLET ORAL
Qty: 35 TABLET | Refills: 0 | Status: SHIPPED | OUTPATIENT
Start: 2017-12-08 | End: 2017-12-22

## 2017-12-08 ASSESSMENT — PAIN SCALES - GENERAL: PAINLEVEL: NO PAIN (0)

## 2017-12-08 NOTE — MR AVS SNAPSHOT
After Visit Summary   12/8/2017    Brian Stephenson    MRN: [id number]           Patient Information     Date Of Birth          1950        Visit Information        Provider Department      12/8/2017 4:20 PM Amilcar Gaona MD [facility]        Today's Diagnoses     Preop general physical exam    -  1    Prostate cancer (H)        Herpes zoster without complication          Care Instructions      Before Your Surgery      Call your surgeon if there is any change in your health. This includes signs of a cold or flu (such as a sore throat, runny nose, cough, rash or fever).    Do not smoke, drink alcohol or take over the counter medicine (unless your surgeon or primary care doctor tells you to) for the 24 hours before and after surgery.    If you take prescribed drugs: Follow your doctor s orders about which medicines to take and which to stop until after surgery.    Eating and drinking prior to surgery: follow the instructions from your surgeon    Take a shower or bath the night before surgery. Use the soap your surgeon gave you to gently clean your skin. If you do not have soap from your surgeon, use your regular soap. Do not shave or scrub the surgery site.  Wear clean pajamas and have clean sheets on your bed.       Hold aspirin for at least a week prior    Morning of procedure take the amlodipine with sip of water    Hold off on lisinopril that day    Start the acyclovir tonight, take 5x daily for one week          Follow-ups after your visit        Who to contact     If you have questions or need follow up information about today's clinic visit or your schedule please contact [facility] directly at [phone].  Normal or non-critical lab and imaging results will be communicated to you by MyChart, letter or phone within 4 business days after the clinic has received the results. If you do not hear from us within 7 days, please contact the clinic  "through [employer] or phone. If you have a critical or abnormal lab result, we will notify you by phone as soon as possible.  Submit refill requests through [employer] or call your pharmacy and they will forward the refill request to us. Please allow 3 business days for your refill to be completed.          Additional Information About Your Visit        [employer] Information     [employer] lets you send messages to your doctor, view your test results, renew your prescriptions, schedule appointments and more. To sign up, go to [email or web address] . Click on \"Log in\" on the left side of the screen, which will take you to the Welcome page. Then click on \"Sign up Now\" on the right side of the page.     You will be asked to enter the access code listed below, as well as some personal information. Please follow the directions to create your username and password.     Your access code is: [email or web address]  Expires: 2017  9:48 AM     Your access code will  in 90 days. If you need help or a new code, please call your [place] clinic or [phone].        Care EveryWhere ID     This is your Care EveryWhere ID. This could be used by other organizations to access your [place] medical records  [id number]        Your Vitals Were     Pulse Temperature Pulse Oximetry BMI (Body Mass Index)          68 98  F (36.7  C) (Oral) 96% 31.17 kg/m2         Blood Pressure from Last 3 Encounters:   17 137/87   17 120/79   17 120/74    Weight from Last 3 Encounters:   17 208 lb (94.3 kg)   17 208 lb 6 oz (94.5 kg)   17 211 lb (95.7 kg)              We Performed the Following     CBC with platelets differential     Creatinine     EKG 12-lead, tracing only     Potassium          Today's Medication Changes          These changes are accurate as of: 17  5:01 PM.  If you have any questions, ask your nurse or doctor.               Start taking these medicines.        Dose/Directions    acyclovir 800 MG " tablet   Commonly known as:  ZOVIRAX   Used for:  Herpes zoster without complication   Started by:  Amilcar Gaona MD        Dose:  800 mg   Take 1 tablet (800 mg) by mouth 5 times daily   Quantity:  35 tablet   Refills:  0            Where to get your medicines      These medications were sent to [id number] - [place], [place] - [address] AT [facility] [address]  [address], [place] [address]     Phone:  [phone]     acyclovir 800 MG tablet                Primary Care Provider Office Phone # Fax #    Amilcar Gaona [phone]       [address] [facility] [address]        Equal Access to Services     MAC SINGER : Hadii fatoumata gardner hadasho Soomaali, waaxda luqadaha, qaybta kaalmada adeegyada, sarah nation . So [facility] [phone].    ATENCIÓN: Si habla español, tiene a meadows disposición servicios gratuitos de asistencia lingüística. Ll[facility] [phone].    We comply with applicable federal civil rights laws and [place] laws. We do not discriminate on the basis of race, color, national origin, age, disability, sex, sexual orientation, or gender identity.            Thank you!     Thank you for choosing [facility]  for your care. Our goal is always to provide you with excellent care. Hearing back from our patients is one way we can continue to improve our services. Please take a few minutes to complete the written survey that you may receive in the mail after your visit with us. Thank you!             Your Updated Medication List - Protect others around you: Learn how to safely use, store and throw away your medicines at www.disposemymeds.org.          This list is accurate as of: 12/8/17  5:01 PM.  Always use your most recent med list.                   Brand Name Dispense Instructions for use Diagnosis    acyclovir 800 MG tablet    ZOVIRAX    35 tablet    Take 1 tablet (800  mg) by mouth 5 times daily    Herpes zoster without complication       amLODIPine 5 MG tablet    NORVASC    90 tablet    TAKE 1 TABLET(5 MG) BY MOUTH DAILY    Essential hypertension with goal blood pressure less than 140/90       aspirin 325 MG EC tablet      1 TABLET DAILY        atorvastatin 10 MG tablet    LIPITOR    90 tablet    Take 1 tablet (10 mg) by mouth daily    Hyperlipidemia LDL goal <130       DAILY MULTIVITAMIN PO      1 DAILY        lisinopril 20 MG tablet    PRINIVIL/ZESTRIL    90 tablet    TAKE 1 TABLET(20 MG) BY MOUTH DAILY    Essential hypertension with goal blood pressure less than 140/90

## 2017-12-08 NOTE — PATIENT INSTRUCTIONS
Before Your Surgery      Call your surgeon if there is any change in your health. This includes signs of a cold or flu (such as a sore throat, runny nose, cough, rash or fever).    Do not smoke, drink alcohol or take over the counter medicine (unless your surgeon or primary care doctor tells you to) for the 24 hours before and after surgery.    If you take prescribed drugs: Follow your doctor s orders about which medicines to take and which to stop until after surgery.    Eating and drinking prior to surgery: follow the instructions from your surgeon    Take a shower or bath the night before surgery. Use the soap your surgeon gave you to gently clean your skin. If you do not have soap from your surgeon, use your regular soap. Do not shave or scrub the surgery site.  Wear clean pajamas and have clean sheets on your bed.       Hold aspirin for at least a week prior    Morning of procedure take the amlodipine with sip of water    Hold off on lisinopril that day    Start the acyclovir tonight, take 5x daily for one week

## 2017-12-08 NOTE — LETTER
[facility]  [address] [place]  [address]  [phone]        December 12, 2017    Brian Stephenson  [address]  [place] [address]        Dear Brian,    Your pre-op labs are fine.     Results for orders placed or performed in visit on 12/08/17   Potassium   Result Value Ref Range    Potassium 4.3 3.4 - 5.3 mmol/L   Creatinine   Result Value Ref Range    Creatinine 0.88 0.66 - 1.25 mg/dL    GFR Estimate 86 >60 mL/min/1.7m2    GFR Estimate If Black >90 >60 mL/min/1.7m2   CBC with platelets differential   Result Value Ref Range    WBC 7.6 4.0 - 11.0 10e9/L    RBC Count 5.30 4.4 - 5.9 10e12/L    Hemoglobin 16.5 13.3 - 17.7 g/dL    Hematocrit 48.2 40.0 - 53.0 %    MCV 91 78 - 100 fl    MCH 31.1 26.5 - 33.0 pg    MCHC 34.2 31.5 - 36.5 g/dL    RDW 14.1 10.0 - 15.0 %    Platelet Count 257 150 - 450 10e9/L    Diff Method Automated Method     % Neutrophils 65.2 %    % Lymphocytes 17.7 %    % Monocytes 12.1 %    % Eosinophils 3.4 %    % Basophils 1.6 %    Absolute Neutrophil 5.0 1.6 - 8.3 10e9/L    Absolute Lymphocytes 1.4 0.8 - 5.3 10e9/L    Absolute Monocytes 0.9 0.0 - 1.3 10e9/L    Absolute Eosinophils 0.3 0.0 - 0.7 10e9/L    Absolute Basophils 0.1 0.0 - 0.2 10e9/L       If you have any questions please call the clinic at [phone].    Sincerely,    Amilcar FAGANL

## 2017-12-08 NOTE — PROGRESS NOTES
[address] [address] [phone]  Dept: [phone]    PRE-OP EVALUATION:  Today's date: 2017    Brian Stephenson (: 1950) presents for pre-operative evaluation assessment as requested by Dr. Paul Harvey.  He requires evaluation and anesthesia risk assessment prior to undergoing surgery/procedure for treatment of prostate cancer .  Proposed procedure: prostate ectomy    Date of Surgery/ Procedure: 2017  Time of Surgery/ Procedure:   Hospital/Surgical Facility: [facility]  Fax number for surgical facility: [phone]  Primary Physician: Amilcar Gaona  Type of Anesthesia Anticipated: General    Patient has a Health Care Directive or Living Will:  NO    Preop Questions 2017   1.  Do you have a history of heart attack, stroke, stent, bypass or surgery on an artery in the head, neck, heart or legs? No   2.  Do you ever have any pain or discomfort in your chest? No   3.  Do you have a history of  Heart Failure? No   4.   Are you troubled by shortness of breath when:  walking on a level surface, or up a slight hill, or at night? No   5.  Do you currently have a cold, bronchitis or other respiratory infection? No   6.  Do you have a cough, shortness of breath, or wheezing? No   7.  Do you sometimes get pains in the calves of your legs when you walk? No   8. Do you or anyone in your family have previous history of blood clots? No   9.  Do you or does anyone in your family have a serious bleeding problem such as prolonged bleeding following surgeries or cuts? No   10. Have you ever had problems with anemia or been told to take iron pills? No   11. Have you had any abnormal blood loss such as black, tarry or bloody stools? No   12. Have you ever had a blood transfusion? No   13. Have you or any of your relatives ever had problems with anesthesia? No   14. Do you have sleep apnea, excessive snoring or daytime  drowsiness? No   15. Do you have any prosthetic heart valves? No   16. Do you have prosthetic joints? No           HPI:                                                      Brief HPI related to upcoming procedure: 67 year old male with prostate cancer. To have prostatectomy.           MEDICAL HISTORY:                                                    Patient Active Problem List    Diagnosis Date Noted     Prostate cancer (H) 09/22/2017     Priority: Medium     Overweight 07/22/2016     Priority: Medium     Hypertension goal BP (blood pressure) < 140/90 01/31/2016     Priority: Medium     Advanced directives, counseling/discussion 09/12/2011     Priority: Medium     Advance Care Planning 8/1/2016: ACP Review of Chart / Resources Provided:  Reviewed chart for advance care plan.  Brian DESAI Marco Antoniolm has no plan or code status on file. Discussed available resources and provided with information. Confirmed code status reflects current choices pending further ACP discussions.  Confirmed/documented legally designated decision makers.  Added by Gracy Bullock        Discussed advance care planning with patient; information given to patient to review. 9/12/2011   Kecia Lind CMA         Hyperlipidemia LDL goal <130 09/12/2011     Priority: Medium      Past Medical History:   Diagnosis Date     Hyperlipidemia      Past Surgical History:   Procedure Laterality Date     CHOLECYSTECTOMY, LAPOROSCOPIC  2008    Cholecystectomy, Laparoscopic     COLONOSCOPY  12-27-12    Sched.FFS w/[facility] 6 mo. Ck Site     CYSTOSCOPY  1979    Hemituria     FLEXIBLE SIGMOIDOSCOPY  7-29-13    Repeat Colonoscopy in 5 yrs.     TONSILLECTOMY  childhood     Current Outpatient Prescriptions   Medication Sig Dispense Refill     amLODIPine (NORVASC) 5 MG tablet TAKE 1 TABLET(5 MG) BY MOUTH DAILY 90 tablet 3     lisinopril (PRINIVIL/ZESTRIL) 20 MG tablet TAKE 1 TABLET(20 MG) BY MOUTH DAILY 90 tablet 3     atorvastatin (LIPITOR) 10 MG tablet Take 1 tablet (10 mg)  by mouth daily 90 tablet 3     DAILY MULTIVITAMIN PO 1 DAILY       ASPIRIN 325 MG PO TBEC 1 TABLET DAILY       OTC products: none    No Known Allergies   Latex Allergy: NO    Social History   Substance Use Topics     Smoking status: Former Smoker     Quit date: 1/1/1997     Smokeless tobacco: Never Used     Alcohol use Yes      Comment: rare     History   Drug Use No       REVIEW OF SYSTEMS:                                                    Constitutional, neuro, ENT, endocrine, pulmonary, cardiac, gastrointestinal, genitourinary, musculoskeletal, integument and psychiatric systems are negative, except as otherwise noted.  No recent illnesses     EXAM:                                                    /87 (BP Location: Right arm, Patient Position: Chair, Cuff Size: Adult Regular)  Pulse 68  Temp 98  F (36.7  C) (Oral)  Wt 208 lb (94.3 kg)  SpO2 96%  BMI 31.17 kg/m2    GENERAL APPEARANCE: healthy, alert and no distress     EYES: EOMI,  PERRL     HENT: ear canals and TM's normal and nose and mouth without ulcers or lesions     NECK: no adenopathy, no asymmetry, masses, or scars and thyroid normal to palpation     RESP: lungs clear to auscultation - no rales, rhonchi or wheezes     CV: regular rates and rhythm, normal S1 S2, no S3 or S4 and no murmur, click or rub     ABDOMEN:  soft, nontender, no HSM or masses and bowel sounds normal     MS: extremities normal- no gross deformities noted, no evidence of inflammation in joints, FROM in all extremities.     SKIN: normal except for likely mild shingles on left chest wall.       NEURO: Normal strength and tone, sensory exam grossly normal, mentation intact and speech normal     PSYCH: mentation appears normal. and affect normal/bright     LYMPHATICS: No axillary, cervical, or supraclavicular nodes    DIAGNOSTICS:                                                      Labs Drawn and in Process: cbc and potassium and creatinine drawn today  Unresulted Labs  Ordered in the Past 30 Days of this Admission     No orders found from 10/9/2017 to 12/9/2017.          Recent Labs   Lab Test  09/22/17   1059  10/10/16   0753   HGB  15.4  14.8   PLT  243  261   NA  141  144   POTASSIUM  4.8  3.8   CR  0.80  0.75   A1C  5.5  5.6    also see labs from earlier this fall    See copy of ekg, normal    IMPRESSION:                                                    Reason for surgery/procedure: prostate cancer, to have prostatectomy  Diagnosis/reason for consult: pre-op clearance    The proposed surgical procedure is considered INTERMEDIATE risk.    REVISED CARDIAC RISK INDEX  The patient has the following serious cardiovascular risks for perioperative complications such as (MI, PE, VFib and 3  AV Block):  No serious cardiac risks  INTERPRETATION: 0 risks: Class I (very low risk - 0.4% complication rate)    The patient has the following additional risks for perioperative complications:  No identified additional risks      ICD-10-CM    1. Preop general physical exam Z01.818        RECOMMENDATIONS:                                                           --Patient is to take all scheduled medications on the day of surgery EXCEPT for modifications listed below.    Patient has already stopped the aspirin    The morning of the procedure patient will hold the lisinopril but take the amlodipine    No cardiac history and no suspicious symptoms    Of note, for the mild shingles we did prescribe a week of acyclovir.  That will be done before procedure.    APPROVAL GIVEN to proceed with proposed procedure, without further diagnostic evaluation       Signed Electronically by: Amilcar Gaona MD    Copy of this evaluation report is provided to requesting physician.    [facility] Preop Guidelines

## 2017-12-11 ENCOUNTER — TELEPHONE (OUTPATIENT)
Dept: FAMILY MEDICINE | Facility: CLINIC | Age: 67
End: 2017-12-11

## 2017-12-11 LAB
CREAT SERPL-MCNC: 0.88 MG/DL (ref 0.66–1.25)
GFR SERPL CREATININE-BSD FRML MDRD: 86 ML/MIN/1.7M2
POTASSIUM SERPL-SCNC: 4.3 MMOL/L (ref 3.4–5.3)

## 2017-12-11 NOTE — TELEPHONE ENCOUNTER
Reason for Call:  Other     Detailed comments: please fax last history and physical and any other information, labs  EKGs or anything else the he ly done on 12/8/17 for the Pre-op to [facility] [phone]    Phone Number Patient can be reached at: Other phone number:  [phone] [place]    Best Time: any    Can we leave a detailed message on this number? YES    Call taken on 12/11/2017 at 4:22 PM by Ondina Culver

## 2017-12-12 NOTE — PROGRESS NOTES
Your pre-op labs are fine.    Amilcar Damon- please fax to [facility] pre-op.  Thanks  Amilcar Gaona MD

## 2017-12-15 ENCOUNTER — TELEPHONE (OUTPATIENT)
Dept: FAMILY MEDICINE | Facility: CLINIC | Age: 67
End: 2017-12-15

## 2017-12-15 NOTE — TELEPHONE ENCOUNTER
Reason for Call:  Other Need Lab Results    Detailed comments: Mariela Auguste calling to request labs from 12/8/17 be faxed to her at [phone]    Phone Number Patient can be reached at: Other phone number:  [phone]*    Best Time: anytime    Can we leave a detailed message on this number? NO    Call taken on 12/15/2017 at 3:05 PM by Apoorva Owen

## 2017-12-18 ENCOUNTER — TRANSFERRED RECORDS (OUTPATIENT)
Dept: HEALTH INFORMATION MANAGEMENT | Facility: CLINIC | Age: 67
End: 2017-12-18

## 2017-12-22 ENCOUNTER — TELEPHONE (OUTPATIENT)
Dept: FAMILY MEDICINE | Facility: CLINIC | Age: 67
End: 2017-12-22

## 2017-12-22 DIAGNOSIS — M79.2 NERVE PAIN: Primary | ICD-10-CM

## 2017-12-22 RX ORDER — GABAPENTIN 100 MG/1
CAPSULE ORAL
Qty: 90 CAPSULE | Refills: 0 | Status: SHIPPED | OUTPATIENT
Start: 2017-12-22 | End: 2018-01-05

## 2017-12-22 NOTE — TELEPHONE ENCOUNTER
Attempt # 1  Called patient at home number.  Was call answered? Yes, relayed below information - explained the dosing schedule for the Gabapentin - patient verbalized understanding - scheduled appointment for January 5 th for follow up.     Zee Sawant RN  [facility]

## 2017-12-22 NOTE — TELEPHONE ENCOUNTER
Called patient - rash on upper chest at last OV - rash is better and blisters have gone away - pain around chest just under the skin, sore like a muscle ache, irritating pain, no fever, under left arm pit around to shoulder blade. 3/10 pain and constant. Had prostate surgery 12/18/2017.     Pharmacy cued.    Routing to PCP to review and advise.    Zee Sawant RN  [facility]

## 2017-12-22 NOTE — TELEPHONE ENCOUNTER
Start low dose gabapentin for nerve pain. I sent in prescription.  Then see us back in clinic in 2-3 weeks.    Please inform patient    Amilcar Gaona MD

## 2017-12-22 NOTE — TELEPHONE ENCOUNTER
Reason for Call:  Other     Detailed comments: Patient stated that he has a deeper sensation under his skin relating to shingles and would like a script prescribed to [facility]     Phone Number Patient can be reached at: Home number on file [phone] (home)    Best Time:     Can we leave a detailed message on this number? Not Applicable    Call taken on 12/22/2017 at 9:14 AM by Matilde Andrews

## 2018-01-05 ENCOUNTER — OFFICE VISIT (OUTPATIENT)
Dept: FAMILY MEDICINE | Facility: CLINIC | Age: 68
End: 2018-01-05
Payer: COMMERCIAL

## 2018-01-05 VITALS
HEART RATE: 104 BPM | HEIGHT: 69 IN | WEIGHT: 191 LBS | TEMPERATURE: 97.7 F | SYSTOLIC BLOOD PRESSURE: 127 MMHG | DIASTOLIC BLOOD PRESSURE: 84 MMHG | BODY MASS INDEX: 28.29 KG/M2

## 2018-01-05 DIAGNOSIS — B02.29 POST HERPETIC NEURALGIA: Primary | ICD-10-CM

## 2018-01-05 DIAGNOSIS — M79.2 NERVE PAIN: ICD-10-CM

## 2018-01-05 DIAGNOSIS — C61 PROSTATE CANCER (H): ICD-10-CM

## 2018-01-05 DIAGNOSIS — I10 HYPERTENSION GOAL BP (BLOOD PRESSURE) < 140/90: ICD-10-CM

## 2018-01-05 PROCEDURE — 99213 OFFICE O/P EST LOW 20 MIN: CPT | Performed by: FAMILY MEDICINE

## 2018-01-05 RX ORDER — GABAPENTIN 100 MG/1
CAPSULE ORAL
Qty: 120 CAPSULE | Refills: 1 | Status: SHIPPED | OUTPATIENT
Start: 2018-01-05 | End: 2018-09-24

## 2018-01-05 ASSESSMENT — PAIN SCALES - GENERAL: PAINLEVEL: MILD PAIN (3)

## 2018-01-05 NOTE — PATIENT INSTRUCTIONS
Can take 2 gabapentin at bedtime    Over the next few days/ weeks, can gradually taper down and if possible off the gabapentin    If still needing the med in a couple months, return to clinic     Come in sooner if needed     Continue blood pressure meds     Activity as tolerated

## 2018-01-05 NOTE — NURSING NOTE
"Chief Complaint   Patient presents with     Shingles     Health Maintenance       Initial /84 (BP Location: Right arm, Patient Position: Chair, Cuff Size: Adult Regular)  Pulse 104  Temp 97.7  F (36.5  C) (Oral)  Ht 5' 8.5\" (1.74 m)  Wt 191 lb (86.6 kg)  BMI 28.62 kg/m2 Estimated body mass index is 28.62 kg/(m^2) as calculated from the following:    Height as of this encounter: 5' 8.5\" (1.74 m).    Weight as of this encounter: 191 lb (86.6 kg).  Medication Reconciliation: complete   Kecia Lind CMA      "

## 2018-01-05 NOTE — MR AVS SNAPSHOT
"              After Visit Summary   1/5/2018    Brian Stephenson    MRN: [id number]           Patient Information     Date Of Birth          1950        Visit Information        Provider Department      1/5/2018 2:20 PM Amilcar Gaona MD [facility]        Today's Diagnoses     Post herpetic neuralgia    -  1    Nerve pain          Care Instructions    Can take 2 gabapentin at bedtime    Over the next few days/ weeks, can gradually taper down and if possible off the gabapentin    If still needing the med in a couple months, return to clinic     Come in sooner if needed     Continue blood pressure meds     Activity as tolerated              Follow-ups after your visit        Who to contact     If you have questions or need follow up information about today's clinic visit or your schedule please contact [facility] directly at [phone].  Normal or non-critical lab and imaging results will be communicated to you by MyChart, letter or phone within 4 business days after the clinic has received the results. If you do not hear from us within 7 days, please contact the clinic through MyChart or phone. If you have a critical or abnormal lab result, we will notify you by phone as soon as possible.  Submit refill requests through [employer] or call your pharmacy and they will forward the refill request to us. Please allow 3 business days for your refill to be completed.          Additional Information About Your Visit        MyChart Information     [employer] lets you send messages to your doctor, view your test results, renew your prescriptions, schedule appointments and more. To sign up, go to [email or web address] . Click on \"Log in\" on the left side of the screen, which will take you to the Welcome page. Then click on \"Sign up Now\" on the right side of the page.     You will be asked to enter the access code listed below, as well as some personal information. Please follow " "the directions to create your username and password.     Your access code is: [email or web address]  Expires: 2018  2:48 PM     Your access code will  in 90 days. If you need help or a new code, please call your [place] clinic or [phone].        Care EveryWhere ID     This is your Care EveryWhere ID. This could be used by other organizations to access your [place] medical records  [id number]        Your Vitals Were     Pulse Temperature Height BMI (Body Mass Index)          104 97.7  F (36.5  C) (Oral) 5' 8.5\" (1.74 m) 28.62 kg/m2         Blood Pressure from Last 3 Encounters:   18 127/84   17 137/87   17 120/79    Weight from Last 3 Encounters:   18 191 lb (86.6 kg)   17 208 lb (94.3 kg)   17 208 lb 6 oz (94.5 kg)              Today, you had the following     No orders found for display         Today's Medication Changes          These changes are accurate as of: 18  2:48 PM.  If you have any questions, ask your nurse or doctor.               These medicines have changed or have updated prescriptions.        Dose/Directions    gabapentin 100 MG capsule   Commonly known as:  NEURONTIN   This may have changed:  additional instructions   Used for:  Nerve pain, Post herpetic neuralgia   Changed by:  Amilcar Gaona MD        1 po in am, 1 po midday, and 2 at bedtime   Quantity:  120 capsule   Refills:  1            Where to get your medicines      These medications were sent to [facility] [address] [place] [address] [place], [place] - [address] [facility] [address]  [address], [facility] [address]    Hours:  Test fax successful 02  KR Phone:  [phone]     gabapentin 100 MG capsule                Primary Care Provider Office Phone # Fax #    Amilcar Gaona [phone]       [address]  [facility] [address]        Equal Access to Services     CECELIA SINGER AH: Rodrigo Adair, linn giang, qaybta " sarah patelkaushal nation ah. So [facility] [phone].    ATENCIÓN: Si iris oh, tiene a meadows disposición servicios gratuitos de asistencia lingüística. Donald al [phone].    We comply with applicable federal civil rights laws and [place] laws. We do not discriminate on the basis of race, color, national origin, age, disability, sex, sexual orientation, or gender identity.            Thank you!     Thank you for choosing [facility]  for your care. Our goal is always to provide you with excellent care. Hearing back from our patients is one way we can continue to improve our services. Please take a few minutes to complete the written survey that you may receive in the mail after your visit with us. Thank you!             Your Updated Medication List - Protect others around you: Learn how to safely use, store and throw away your medicines at www.disposemymeds.org.          This list is accurate as of: 1/5/18  2:48 PM.  Always use your most recent med list.                   Brand Name Dispense Instructions for use Diagnosis    amLODIPine 5 MG tablet    NORVASC    90 tablet    TAKE 1 TABLET(5 MG) BY MOUTH DAILY    Essential hypertension with goal blood pressure less than 140/90       aspirin 325 MG EC tablet      1 TABLET DAILY        atorvastatin 10 MG tablet    LIPITOR    90 tablet    Take 1 tablet (10 mg) by mouth daily    Hyperlipidemia LDL goal <130       DAILY MULTIVITAMIN PO      1 DAILY        gabapentin 100 MG capsule    NEURONTIN    120 capsule    1 po in am, 1 po midday, and 2 at bedtime    Nerve pain, Post herpetic neuralgia       lisinopril 20 MG tablet    PRINIVIL/ZESTRIL    90 tablet    TAKE 1 TABLET(20 MG) BY MOUTH DAILY    Essential hypertension with goal blood pressure less than 140/90

## 2018-01-05 NOTE — PROGRESS NOTES
SUBJECTIVE:   Brian Stephenson is a 67 year old male who presents to clinic today for the following health issues:       Follow up on shingles    none    Problem list and histories reviewed & adjusted, as indicated.  Additional history: as documented         Reviewed and updated as needed this visit by clinical staff  Tobacco  Allergies  Meds  Med Hx  Surg Hx  Fam Hx  Soc Hx      Reviewed and updated as needed this visit by Provider          Dec 18 had the prostatectomy    About that time got shingles also      Nerve pain not quite as bad as 2 weeks ago but still significant    Had the acyclovir initiallly    Then was started on gabapentin    currenltly 100 mg tid    Full physical not done     Mentation and affect are fine    No tremor of speech or extremity    Heart and lung exam normal    No resp distress    He has some residual mild skin remnants of the shingles rash in a dermatomal pattern from left scapular wrapping around left hemithorax to left chest wall area    Sensation is subjectively altered per patient in the affected are compared to rest of skin areas    Moves shoulder fine    Has well healing scars from the laparascopic surgery    ASSESSMENT / PLAN:  (B02.29) Post herpetic neuralgia  (primary encounter diagnosis)  Comment: can go up to 200 mg at bedtime but then over the next few days/ weeks patient will gradually taper down on gabapentin.  If able to taper off entirely fine.  If not, see us in a couple months, sooner if needed.   Plan: gabapentin (NEURONTIN) 100 MG capsule             (M79.2) Nerve pain  Comment: as above   Plan: gabapentin (NEURONTIN) 100 MG capsule             (C61) Prostate cancer (H)  Comment: per patient the margins etc were good   Plan: recovering okay s/p surgery.   Per patient to get the catheter out next week.     (I10) Hypertension goal BP (blood pressure) < 140/90  Comment: at goal   Plan: no change       I reviewed the patient's medications, allergies, medical  history, family history, and social history.    mAilcar Gaona MD

## 2018-03-04 ENCOUNTER — HEALTH MAINTENANCE LETTER (OUTPATIENT)
Age: 68
End: 2018-03-04

## 2018-03-05 ENCOUNTER — OFFICE VISIT (OUTPATIENT)
Dept: FAMILY MEDICINE | Facility: CLINIC | Age: 68
End: 2018-03-05
Payer: COMMERCIAL

## 2018-03-05 VITALS
WEIGHT: 195 LBS | OXYGEN SATURATION: 94 % | HEART RATE: 80 BPM | DIASTOLIC BLOOD PRESSURE: 84 MMHG | SYSTOLIC BLOOD PRESSURE: 138 MMHG | BODY MASS INDEX: 29.22 KG/M2 | TEMPERATURE: 97.4 F

## 2018-03-05 DIAGNOSIS — Z90.79 HISTORY OF PROSTATECTOMY: ICD-10-CM

## 2018-03-05 DIAGNOSIS — N52.9 ERECTILE DYSFUNCTION, UNSPECIFIED ERECTILE DYSFUNCTION TYPE: Primary | ICD-10-CM

## 2018-03-05 DIAGNOSIS — I10 HYPERTENSION GOAL BP (BLOOD PRESSURE) < 140/90: ICD-10-CM

## 2018-03-05 PROCEDURE — 99213 OFFICE O/P EST LOW 20 MIN: CPT | Performed by: FAMILY MEDICINE

## 2018-03-05 RX ORDER — SILDENAFIL 100 MG/1
TABLET, FILM COATED ORAL
Qty: 6 TABLET | Refills: 1 | Status: SHIPPED | OUTPATIENT
Start: 2018-03-05 | End: 2018-09-24 | Stop reason: ALTCHOICE

## 2018-03-05 RX ORDER — SILDENAFIL CITRATE 20 MG/1
TABLET ORAL
Qty: 12 TABLET | Refills: 1 | Status: SHIPPED | OUTPATIENT
Start: 2018-03-05 | End: 2018-09-24

## 2018-03-05 ASSESSMENT — PAIN SCALES - GENERAL: PAINLEVEL: NO PAIN (0)

## 2018-03-05 NOTE — PROGRESS NOTES
SUBJECTIVE:   Brian Stephenson is a 68 year old male who presents to clinic today for the following health issues:       Discuss taking viagra    none    Problem list and histories reviewed & adjusted, as indicated.  Additional history: as documented         Reviewed and updated as needed this visit by clinical staff  Allergies  Meds       Reviewed and updated as needed this visit by Provider          erections were a problem before prostate issue    Worse after surgery    No erection at all          Full physical not done     Mentation and affect are fine    No tremor of speech or extremity      ASSESSMENT / PLAN:  (N52.9) Erectile dysfunction, unspecified erectile dysfunction type  (primary encounter diagnosis)  Comment: discussed in detail.  Patient has never tried med in this class.  Gave prescriptions on paper for the two doses of sildenafil; show both to pharmacy to compare price etc.  Talked of possible side effects.   Plan: sildenafil (VIAGRA) 100 MG tablet, sildenafil         (REVATIO) 20 MG tablet         Call with update.      (Z90.79) History of prostatectomy  Comment: this may make success of viagra type med less likely   Plan: discussed     (I10) Hypertension goal BP (blood pressure) < 140/90  Comment: barely at goal  Plan: no change in meds; monitor blood pressure       I reviewed the patient's medications, allergies, medical history, family history, and social history.    Amilcar Gaona MD

## 2018-03-05 NOTE — PATIENT INSTRUCTIONS
Show both prescriptions to pharmacist, fill one but you can tell them how many pills you want to pay for    Let us know how things work    Continue other meds as is    Call with major issues / side effects

## 2018-03-05 NOTE — MR AVS SNAPSHOT
"              After Visit Summary   3/5/2018    Brian Stephenson    MRN: [id number]           Patient Information     Date Of Birth          1950        Visit Information        Provider Department      3/5/2018 3:00 PM Amilcar Gaona MD [facility]        Today's Diagnoses     Erectile dysfunction, unspecified erectile dysfunction type    -  1      Care Instructions    Show both prescriptions to pharmacist, fill one but you can tell them how many pills you want to pay for    Let us know how things work    Continue other meds as is    Call with major issues / side effects           Follow-ups after your visit        Who to contact     If you have questions or need follow up information about today's clinic visit or your schedule please contact [facility] directly at [phone].  Normal or non-critical lab and imaging results will be communicated to you by MyChart, letter or phone within 4 business days after the clinic has received the results. If you do not hear from us within 7 days, please contact the clinic through [employer] or phone. If you have a critical or abnormal lab result, we will notify you by phone as soon as possible.  Submit refill requests through [facility] or call your pharmacy and they will forward the refill request to us. Please allow 3 business days for your refill to be completed.          Additional Information About Your Visit        [email or web address] Information     [facility] lets you send messages to your doctor, view your test results, renew your prescriptions, schedule appointments and more. To sign up, go to [email or web address] . Click on \"Log in\" on the left side of the screen, which will take you to the Welcome page. Then click on \"Sign up Now\" on the right side of the page.     You will be asked to enter the access code listed below, as well as some personal information. Please follow the directions to create your username and password.     Your " access code is: [email or web address]  Expires: 2018  2:48 PM     Your access code will  in 90 days. If you need help or a new code, please call your [place] clinic or [phone].        Care EveryWhere ID     This is your Care EveryWhere ID. This could be used by other organizations to access your [place] medical records  [id number]        Your Vitals Were     Pulse Temperature Pulse Oximetry BMI (Body Mass Index)          80 97.4  F (36.3  C) (Oral) 94% 29.22 kg/m2         Blood Pressure from Last 3 Encounters:   18 138/84   18 127/84   17 137/87    Weight from Last 3 Encounters:   18 195 lb (88.5 kg)   18 191 lb (86.6 kg)   17 208 lb (94.3 kg)              Today, you had the following     No orders found for display         Today's Medication Changes          These changes are accurate as of 3/5/18  3:34 PM.  If you have any questions, ask your nurse or doctor.               Start taking these medicines.        Dose/Directions    sildenafil 100 MG tablet   Commonly known as:  VIAGRA   Used for:  Erectile dysfunction, unspecified erectile dysfunction type   Started by:  Amilcar Gaona MD        Take 1/2 to 1 po 30 min to 4 hrs before sex. Do not use with nitroglycerin, terazosin or doxazosin.   Quantity:  6 tablet   Refills:  1       sildenafil 20 MG tablet   Commonly known as:  REVATIO   Used for:  Erectile dysfunction, unspecified erectile dysfunction type   Started by:  Amilcar Gaona MD        Take 1 tablet (20 mg) po one hour before anticipated intercourse.  Never use with nitroglycerin, terazosin or doxazosin.   Quantity:  12 tablet   Refills:  1            Where to get your medicines      Some of these will need a paper prescription and others can be bought over the counter.  Ask your nurse if you have questions.     Bring a paper prescription for each of these medications     sildenafil 100 MG tablet    sildenafil 20 MG tablet                Primary Care  Provider Office Phone # Fax #    Amilcar Gaona [phone]       [address] [facility] [address]        Equal Access to Services     CECELIA SINGER : Hadii aad ku hadvanisanam Adair, wamarioda luqadaha, qaybta kaalmada dylan, sarah merazmichelle frairekaushal bellamy laDevikavaishali morrison. So [facility] [phone].    ATENCIÓN: Si habla español, tiene a meadows disposición servicios gratuitos de asistencia lingüística. Llame al [phone].    We comply with applicable federal civil rights laws and [place] laws. We do not discriminate on the basis of race, color, national origin, age, disability, sex, sexual orientation, or gender identity.            Thank you!     Thank you for choosing [facility]  for your care. Our goal is always to provide you with excellent care. Hearing back from our patients is one way we can continue to improve our services. Please take a few minutes to complete the written survey that you may receive in the mail after your visit with us. Thank you!             Your Updated Medication List - Protect others around you: Learn how to safely use, store and throw away your medicines at www.disposemymeds.org.          This list is accurate as of 3/5/18  3:34 PM.  Always use your most recent med list.                   Brand Name Dispense Instructions for use Diagnosis    amLODIPine 5 MG tablet    NORVASC    90 tablet    TAKE 1 TABLET(5 MG) BY MOUTH DAILY    Essential hypertension with goal blood pressure less than 140/90       aspirin 325 MG EC tablet      1 TABLET DAILY        atorvastatin 10 MG tablet    LIPITOR    90 tablet    Take 1 tablet (10 mg) by mouth daily    Hyperlipidemia LDL goal <130       DAILY MULTIVITAMIN PO      1 DAILY        gabapentin 100 MG capsule    NEURONTIN    120 capsule    1 po in am, 1 po midday, and 2 at bedtime    Nerve pain, Post herpetic neuralgia       lisinopril 20 MG tablet    PRINIVIL/ZESTRIL    90 tablet    TAKE 1 TABLET(20 MG) BY  MOUTH DAILY    Essential hypertension with goal blood pressure less than 140/90       sildenafil 100 MG tablet    VIAGRA    6 tablet    Take 1/2 to 1 po 30 min to 4 hrs before sex. Do not use with nitroglycerin, terazosin or doxazosin.    Erectile dysfunction, unspecified erectile dysfunction type       sildenafil 20 MG tablet    REVATIO    12 tablet    Take 1 tablet (20 mg) po one hour before anticipated intercourse.  Never use with nitroglycerin, terazosin or doxazosin.    Erectile dysfunction, unspecified erectile dysfunction type

## 2018-07-02 ENCOUNTER — HEALTH MAINTENANCE LETTER (OUTPATIENT)
Age: 68
End: 2018-07-02

## 2018-08-06 ENCOUNTER — TRANSFERRED RECORDS (OUTPATIENT)
Dept: HEALTH INFORMATION MANAGEMENT | Facility: CLINIC | Age: 68
End: 2018-08-06

## 2018-08-30 DIAGNOSIS — I10 ESSENTIAL HYPERTENSION WITH GOAL BLOOD PRESSURE LESS THAN 140/90: ICD-10-CM

## 2018-08-31 RX ORDER — LISINOPRIL 20 MG/1
TABLET ORAL
Qty: 90 TABLET | Refills: 1 | Status: SHIPPED | OUTPATIENT
Start: 2018-08-31 | End: 2018-09-24

## 2018-08-31 NOTE — TELEPHONE ENCOUNTER
"Requested Prescriptions   Pending Prescriptions Disp Refills     lisinopril (PRINIVIL/ZESTRIL) 20 MG tablet [Pharmacy Med Name: LISINOPRIL 20MG TABLETS] 90 tablet 0    Last Written Prescription Date:  9-22-17  Last Fill Quantity: 90,  # refills: 3   Last office visit: 3/5/2018 with prescribing provider:  3-5-18   Future Office Visit:   Next 5 appointments (look out 90 days)     Sep 24, 2018  8:20 AM CDT   PHYSICAL with Amilcar Gaona MD   [facility] ([facility])    [address] [place] [id number] [phone]                  Sig: TAKE 1 TABLET(20 MG) BY MOUTH DAILY    ACE Inhibitors (Including Combos) Protocol Passed    8/30/2018  8:09 PM       Passed - Blood pressure under 140/90 in past 12 months    BP Readings from Last 3 Encounters:   03/05/18 138/84   01/05/18 127/84   12/08/17 137/87                Passed - Recent (12 mo) or future (30 days) visit within the authorizing provider's specialty    Patient had office visit in the last 12 months or has a visit in the next 30 days with authorizing provider or within the authorizing provider's specialty.  See \"Patient Info\" tab in inbasket, or \"Choose Columns\" in Meds & Orders section of the refill encounter.           Passed - Patient is age 18 or older       Passed - Normal serum creatinine on file in past 12 months    Recent Labs   Lab Test  12/08/17   1705   CR  0.88            Passed - Normal serum potassium on file in past 12 months    Recent Labs   Lab Test  12/08/17   1705   POTASSIUM  4.3               "

## 2018-09-24 ENCOUNTER — OFFICE VISIT (OUTPATIENT)
Dept: FAMILY MEDICINE | Facility: CLINIC | Age: 68
End: 2018-09-24
Payer: COMMERCIAL

## 2018-09-24 VITALS
BODY MASS INDEX: 30.59 KG/M2 | SYSTOLIC BLOOD PRESSURE: 109 MMHG | OXYGEN SATURATION: 95 % | DIASTOLIC BLOOD PRESSURE: 65 MMHG | HEART RATE: 44 BPM | TEMPERATURE: 97 F | WEIGHT: 204.13 LBS

## 2018-09-24 DIAGNOSIS — E78.5 HYPERLIPIDEMIA LDL GOAL <130: ICD-10-CM

## 2018-09-24 DIAGNOSIS — Z12.5 SCREENING FOR PROSTATE CANCER: ICD-10-CM

## 2018-09-24 DIAGNOSIS — Z00.00 ROUTINE GENERAL MEDICAL EXAMINATION AT A HEALTH CARE FACILITY: Primary | ICD-10-CM

## 2018-09-24 DIAGNOSIS — R53.83 FATIGUE, UNSPECIFIED TYPE: ICD-10-CM

## 2018-09-24 DIAGNOSIS — Z23 NEED FOR PROPHYLACTIC VACCINATION AND INOCULATION AGAINST INFLUENZA: ICD-10-CM

## 2018-09-24 DIAGNOSIS — R73.01 IMPAIRED FASTING GLUCOSE: ICD-10-CM

## 2018-09-24 DIAGNOSIS — Z23 NEED FOR SHINGLES VACCINE: ICD-10-CM

## 2018-09-24 DIAGNOSIS — I10 ESSENTIAL HYPERTENSION WITH GOAL BLOOD PRESSURE LESS THAN 140/90: ICD-10-CM

## 2018-09-24 DIAGNOSIS — Z23 NEED FOR TD VACCINE: ICD-10-CM

## 2018-09-24 DIAGNOSIS — Z12.2 ENCOUNTER FOR SCREENING FOR LUNG CANCER: ICD-10-CM

## 2018-09-24 LAB
ALBUMIN SERPL-MCNC: 3.8 G/DL (ref 3.4–5)
ALP SERPL-CCNC: 85 U/L (ref 40–150)
ALT SERPL W P-5'-P-CCNC: 26 U/L (ref 0–70)
ANION GAP SERPL CALCULATED.3IONS-SCNC: 8 MMOL/L (ref 3–14)
AST SERPL W P-5'-P-CCNC: 19 U/L (ref 0–45)
BASOPHILS # BLD AUTO: 0.1 10E9/L (ref 0–0.2)
BASOPHILS NFR BLD AUTO: 1 %
BILIRUB SERPL-MCNC: 0.8 MG/DL (ref 0.2–1.3)
BUN SERPL-MCNC: 10 MG/DL (ref 7–30)
CALCIUM SERPL-MCNC: 8.6 MG/DL (ref 8.5–10.1)
CHLORIDE SERPL-SCNC: 107 MMOL/L (ref 94–109)
CHOLEST SERPL-MCNC: 149 MG/DL
CO2 SERPL-SCNC: 27 MMOL/L (ref 20–32)
CREAT SERPL-MCNC: 0.88 MG/DL (ref 0.66–1.25)
DIFFERENTIAL METHOD BLD: NORMAL
EOSINOPHIL # BLD AUTO: 0.3 10E9/L (ref 0–0.7)
EOSINOPHIL NFR BLD AUTO: 4.9 %
ERYTHROCYTE [DISTWIDTH] IN BLOOD BY AUTOMATED COUNT: 13.5 % (ref 10–15)
GFR SERPL CREATININE-BSD FRML MDRD: 86 ML/MIN/1.7M2
GLUCOSE SERPL-MCNC: 102 MG/DL (ref 70–99)
HBA1C MFR BLD: 5.5 % (ref 0–5.6)
HCT VFR BLD AUTO: 45.1 % (ref 40–53)
HDLC SERPL-MCNC: 56 MG/DL
HGB BLD-MCNC: 15.2 G/DL (ref 13.3–17.7)
LDLC SERPL CALC-MCNC: 75 MG/DL
LYMPHOCYTES # BLD AUTO: 1.3 10E9/L (ref 0.8–5.3)
LYMPHOCYTES NFR BLD AUTO: 20.3 %
MCH RBC QN AUTO: 31.4 PG (ref 26.5–33)
MCHC RBC AUTO-ENTMCNC: 33.7 G/DL (ref 31.5–36.5)
MCV RBC AUTO: 93 FL (ref 78–100)
MONOCYTES # BLD AUTO: 0.6 10E9/L (ref 0–1.3)
MONOCYTES NFR BLD AUTO: 9 %
NEUTROPHILS # BLD AUTO: 4.1 10E9/L (ref 1.6–8.3)
NEUTROPHILS NFR BLD AUTO: 64.8 %
NONHDLC SERPL-MCNC: 93 MG/DL
PLATELET # BLD AUTO: 258 10E9/L (ref 150–450)
POTASSIUM SERPL-SCNC: 4.3 MMOL/L (ref 3.4–5.3)
PROT SERPL-MCNC: 7.2 G/DL (ref 6.8–8.8)
PSA SERPL-ACNC: <0.01 UG/L (ref 0–4)
RBC # BLD AUTO: 4.84 10E12/L (ref 4.4–5.9)
SODIUM SERPL-SCNC: 142 MMOL/L (ref 133–144)
TRIGL SERPL-MCNC: 92 MG/DL
TSH SERPL DL<=0.005 MIU/L-ACNC: 1.77 MU/L (ref 0.4–4)
WBC # BLD AUTO: 6.3 10E9/L (ref 4–11)

## 2018-09-24 PROCEDURE — 80053 COMPREHEN METABOLIC PANEL: CPT | Performed by: FAMILY MEDICINE

## 2018-09-24 PROCEDURE — 90714 TD VACC NO PRESV 7 YRS+ IM: CPT | Performed by: FAMILY MEDICINE

## 2018-09-24 PROCEDURE — 90662 IIV NO PRSV INCREASED AG IM: CPT | Performed by: FAMILY MEDICINE

## 2018-09-24 PROCEDURE — 84443 ASSAY THYROID STIM HORMONE: CPT | Performed by: FAMILY MEDICINE

## 2018-09-24 PROCEDURE — 90472 IMMUNIZATION ADMIN EACH ADD: CPT | Performed by: FAMILY MEDICINE

## 2018-09-24 PROCEDURE — G0103 PSA SCREENING: HCPCS | Performed by: FAMILY MEDICINE

## 2018-09-24 PROCEDURE — 90471 IMMUNIZATION ADMIN: CPT | Performed by: FAMILY MEDICINE

## 2018-09-24 PROCEDURE — 80061 LIPID PANEL: CPT | Performed by: FAMILY MEDICINE

## 2018-09-24 PROCEDURE — 83036 HEMOGLOBIN GLYCOSYLATED A1C: CPT | Performed by: FAMILY MEDICINE

## 2018-09-24 PROCEDURE — 85025 COMPLETE CBC W/AUTO DIFF WBC: CPT | Performed by: FAMILY MEDICINE

## 2018-09-24 PROCEDURE — 99397 PER PM REEVAL EST PAT 65+ YR: CPT | Mod: 25 | Performed by: FAMILY MEDICINE

## 2018-09-24 PROCEDURE — 90750 HZV VACC RECOMBINANT IM: CPT | Performed by: FAMILY MEDICINE

## 2018-09-24 PROCEDURE — 36415 COLL VENOUS BLD VENIPUNCTURE: CPT | Performed by: FAMILY MEDICINE

## 2018-09-24 RX ORDER — LISINOPRIL 20 MG/1
TABLET ORAL
Qty: 90 TABLET | Refills: 3 | Status: SHIPPED | OUTPATIENT
Start: 2018-09-24 | End: 2019-10-02

## 2018-09-24 RX ORDER — AMLODIPINE BESYLATE 5 MG/1
TABLET ORAL
Qty: 90 TABLET | Refills: 3 | Status: SHIPPED | OUTPATIENT
Start: 2018-09-24 | End: 2019-10-02

## 2018-09-24 RX ORDER — ATORVASTATIN CALCIUM 10 MG/1
10 TABLET, FILM COATED ORAL DAILY
Qty: 90 TABLET | Refills: 3 | Status: SHIPPED | OUTPATIENT
Start: 2018-09-24 | End: 2019-10-02

## 2018-09-24 ASSESSMENT — ENCOUNTER SYMPTOMS
COUGH: 0
CONSTIPATION: 0
EYE PAIN: 0
HEMATOCHEZIA: 0
HEMATURIA: 0
CHILLS: 0
ABDOMINAL PAIN: 0
DIZZINESS: 0
DIARRHEA: 0

## 2018-09-24 ASSESSMENT — ACTIVITIES OF DAILY LIVING (ADL)
I_NEED_ASSISTANCE_FOR_THE_FOLLOWING_DAILY_ACTIVITIES:: NO ASSISTANCE IS NEEDED
CURRENT_FUNCTION: NO ASSISTANCE NEEDED

## 2018-09-24 ASSESSMENT — PAIN SCALES - GENERAL: PAINLEVEL: NO PAIN (0)

## 2018-09-24 NOTE — PROGRESS NOTES

## 2018-09-24 NOTE — PROGRESS NOTES
SUBJECTIVE:   CC: Brian Stephenson is an 68 year old male who presents for preventative health visit.     Physical   Annual:     Getting at least 3 servings of Calcium per day:  Yes    Bi-annual eye exam:  Yes    Dental care twice a year:  Yes    Sleep apnea or symptoms of sleep apnea:  None    Diet:  Regular (no restrictions)    Frequency of exercise:  None    Taking medications regularly:  Yes    Medication side effects:  None    Additional concerns today:  No    Ability to successfully perform activities of daily living: no assistance needed    Home Safety:  No safety concerns identified    Hearing Impairment: no hearing concerns        None    Feeling okay physically    Completed cancer surgery    Last December    Dx a year ago    Patient would like psa checked    No complications from surgery     Just had colonoscopy, see report    Occasional swelling of the legs, but always returns to normal by morning    Wants flu shot    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        Today's PHQ-2 Score:   PHQ-2 ( 1999 Pfizer) 9/24/2018   Q1: Little interest or pleasure in doing things 0   Q2: Feeling down, depressed or hopeless 0   PHQ-2 Score 0   Q1: Little interest or pleasure in doing things Not at all   Q2: Feeling down, depressed or hopeless Not at all   PHQ-2 Score 0       Abuse: Current or Past(Physical, Sexual or Emotional)- No  Do you feel safe in your environment - Yes    Social History   Substance Use Topics     Smoking status: Former Smoker     Quit date: 1/1/1997     Smokeless tobacco: Never Used     Alcohol use Yes      Comment: rare     Alcohol Use 9/24/2018   If you drink alcohol do you typically have greater than 3 drinks per day OR greater than 7 drinks per week? No   No flowsheet data found.    Last PSA:   PSA   Date Value Ref Range Status   04/10/2017 5.54 (H) 0 - 4 ug/L Final     Comment:     Assay Method:  Chemiluminescence using Siemens Vista analyzer       Reviewed orders with patient. Reviewed health maintenance and  updated orders accordingly - Yes       Reviewed and updated as needed this visit by clinical staff  Tobacco  Allergies  Med Hx  Surg Hx  Fam Hx  Soc Hx        Reviewed and updated as needed this visit by Provider            Review of Systems   Constitutional: Negative for chills.   HENT: Negative for congestion and ear pain.    Eyes: Negative for pain.   Respiratory: Negative for cough.    Cardiovascular: Negative for chest pain.   Gastrointestinal: Negative for abdominal pain, constipation, diarrhea and hematochezia.   Genitourinary: Negative for hematuria.   Neurological: Negative for dizziness.          Not much exercise outside of work    No eye problems      OBJECTIVE:   /65 (BP Location: Right arm, Patient Position: Chair, Cuff Size: Adult Regular)  Pulse (!) 44  Temp 97  F (36.1  C) (Oral)  Wt 204 lb 2 oz (92.6 kg)  SpO2 95%  BMI 30.59 kg/m2    Physical Exam  GENERAL: healthy, alert and no distress  EYES: Eyes grossly normal to inspection, PERRL and conjunctivae and sclerae normal  HENT: ear canals and TM's normal, nose and mouth without ulcers or lesions  NECK: no adenopathy, no asymmetry, masses, or scars and thyroid normal to palpation  RESP: lungs clear to auscultation - no rales, rhonchi or wheezes  CV: regular rate and rhythm, normal S1 S2, no S3 or S4, no murmur, click or rub, no peripheral edema and peripheral pulses strong  ABDOMEN: soft, nontender, no hepatosplenomegaly, no masses and bowel sounds normal  MS: no gross musculoskeletal defects noted, no edema  SKIN: no suspicious lesions or rashes  NEURO: Normal strength and tone, mentation intact and speech normal  PSYCH: mentation appears normal, affect normal/bright    Diagnostic Test Results:  none     ASSESSMENT/PLAN:   Brian was seen today for wellness visit and health maintenance.    Diagnoses and all orders for this visit:    Routine general medical examination at a health care facility    Essential hypertension with goal blood  "pressure less than 140/90  -     amLODIPine (NORVASC) 5 MG tablet; TAKE 1 TABLET(5 MG) BY MOUTH DAILY  -     lisinopril (PRINIVIL/ZESTRIL) 20 MG tablet; TAKE 1 TABLET(20 MG) BY MOUTH DAILY  -     VACCINE ADMINISTRATION, EACH ADDITIONAL    Hyperlipidemia LDL goal <130  -     atorvastatin (LIPITOR) 10 MG tablet; Take 1 tablet (10 mg) by mouth daily  -     Comprehensive metabolic panel  -     Lipid panel reflex to direct LDL Fasting    Need for TD vaccine  -     VACCINE ADMINISTRATION, INITIAL  -     TD (ADULT, 7+) PRESERVE FREE    Need for shingles vaccine  -     ZOSTER VACCINE RECOMBINANT ADJUVANTED IM NJX    Screening for prostate cancer  -     Prostate spec antigen screen    Fatigue, unspecified type  -     CBC with platelets differential  -     TSH with free T4 reflex    Impaired fasting glucose  -     Hemoglobin A1c    Encounter for screening for lung cancer  -     CT Chest Lung Cancer Scrn Low Dose wo; Future    Need for prophylactic vaccination and inoculation against influenza  -     FLU VACCINE, INCREASED ANTIGEN, PRESV FREE, AGE 65+ [86162]      Overall patient in stable health  Doing well s/p prostatectomy for ca  Flu shot given  Up to date on colonoscopy  Later this fall patient can schedule another CT chest screen ( as advised after last year's scan )  Refill meds  Check labs   Patient wanted shingles shot also  Due for TD  See AVS        COUNSELING:   Reviewed preventive health counseling, as reflected in patient instructions       Regular exercise       Healthy diet/nutrition       Vision screening       Colon cancer screening       Prostate cancer screening    BP Readings from Last 1 Encounters:   09/24/18 109/65     Estimated body mass index is 30.59 kg/(m^2) as calculated from the following:    Height as of 1/5/18: 5' 8.5\" (1.74 m).    Weight as of this encounter: 204 lb 2 oz (92.6 kg).      Weight management plan: Discussed healthy diet and exercise guidelines and patient will follow up in 12 months " in clinic to re-evaluate.     reports that he quit smoking about 21 years ago. He has never used smokeless tobacco.      Counseling Resources:  ATP IV Guidelines  Pooled Cohorts Equation Calculator  FRAX Risk Assessment  ICSI Preventive Guidelines  Dietary Guidelines for Americans, 2010  USDA's MyPlate  ASA Prophylaxis  Lung CA Screening    Amilcar Gaona MD  [facility]  Answers for HPI/ROS submitted by the patient on 9/24/2018   PHQ-2 Score: 0

## 2018-09-24 NOTE — PATIENT INSTRUCTIONS
Increase exercise as able    Low sodium diet    Continue same medications    We will send you lab results    Call and schedule CT screening exam for lungs

## 2018-09-24 NOTE — LETTER
[facility]   [address] [place]  [address]  [phone]                                   September 25, 2018    Brian Stephenson  [address]   [place] [address]        Dear J Luis Torres.  Prostate blood test is basically zero.    Other labs are fine.    The normal hemoglobin a1c means you do not have diabetes.    Results for orders placed or performed in visit on 09/24/18   Hemoglobin A1c   Result Value Ref Range    Hemoglobin A1C 5.5 0 - 5.6 %   Comprehensive metabolic panel   Result Value Ref Range    Sodium 142 133 - 144 mmol/L    Potassium 4.3 3.4 - 5.3 mmol/L    Chloride 107 94 - 109 mmol/L    Carbon Dioxide 27 20 - 32 mmol/L    Anion Gap 8 3 - 14 mmol/L    Glucose 102 (H) 70 - 99 mg/dL    Urea Nitrogen 10 7 - 30 mg/dL    Creatinine 0.88 0.66 - 1.25 mg/dL    GFR Estimate 86 >60 mL/min/1.7m2    GFR Estimate If Black >90 >60 mL/min/1.7m2    Calcium 8.6 8.5 - 10.1 mg/dL    Bilirubin Total 0.8 0.2 - 1.3 mg/dL    Albumin 3.8 3.4 - 5.0 g/dL    Protein Total 7.2 6.8 - 8.8 g/dL    Alkaline Phosphatase 85 40 - 150 U/L    ALT 26 0 - 70 U/L    AST 19 0 - 45 U/L   Lipid panel reflex to direct LDL Fasting   Result Value Ref Range    Cholesterol 149 <200 mg/dL    Triglycerides 92 <150 mg/dL    HDL Cholesterol 56 >39 mg/dL    LDL Cholesterol Calculated 75 <100 mg/dL    Non HDL Cholesterol 93 <130 mg/dL   Prostate spec antigen screen   Result Value Ref Range    PSA <0.01 0 - 4 ug/L   CBC with platelets differential   Result Value Ref Range    WBC 6.3 4.0 - 11.0 10e9/L    RBC Count 4.84 4.4 - 5.9 10e12/L    Hemoglobin 15.2 13.3 - 17.7 g/dL    Hematocrit 45.1 40.0 - 53.0 %    MCV 93 78 - 100 fl    MCH 31.4 26.5 - 33.0 pg    MCHC 33.7 31.5 - 36.5 g/dL    RDW 13.5 10.0 - 15.0 %    Platelet Count 258 150 - 450 10e9/L    % Neutrophils 64.8 %    % Lymphocytes 20.3 %    % Monocytes 9.0 %    % Eosinophils 4.9 %    % Basophils 1.0 %    Absolute Neutrophil 4.1 1.6 - 8.3 10e9/L     Absolute Lymphocytes 1.3 0.8 - 5.3 10e9/L    Absolute Monocytes 0.6 0.0 - 1.3 10e9/L    Absolute Eosinophils 0.3 0.0 - 0.7 10e9/L    Absolute Basophils 0.1 0.0 - 0.2 10e9/L    Diff Method Automated Method    TSH with free T4 reflex   Result Value Ref Range    TSH 1.77 0.40 - 4.00 mU/L       If you have any questions please call the clinic at [phone]    Sincerely,    Amilcar Gaona MD  [facility]

## 2018-09-24 NOTE — MR AVS SNAPSHOT
After Visit Summary   9/24/2018    Brian Stephenson    MRN: [id number]           Patient Information     Date Of Birth          1950        Visit Information        Provider Department      9/24/2018 8:20 AM Amilcar Gaona MD [facility]        Today's Diagnoses     Need for TD vaccine    -  1    Essential hypertension with goal blood pressure less than 140/90        Hyperlipidemia LDL goal <130        Need for shingles vaccine        Screening for prostate cancer        Fatigue, unspecified type        Impaired fasting glucose        Encounter for screening for lung cancer          Care Instructions    Increase exercise as able    Low sodium diet    Continue same medications    We will send you lab results    Call and schedule CT screening exam for lungs              Follow-ups after your visit        Future tests that were ordered for you today     Open Future Orders        Priority Expected Expires Ordered    CT Chest Lung Cancer Scrn Low Dose wo Routine  9/24/2019 9/24/2018            Who to contact     If you have questions or need follow up information about today's clinic visit or your schedule please contact [facility] directly at [phone].  Normal or non-critical lab and imaging results will be communicated to you by [employer], letter or phone within 4 business days after the clinic has received the results. If you do not hear from us within 7 days, please contact the clinic through [employer] or phone. If you have a critical or abnormal lab result, we will notify you by phone as soon as possible.  Submit refill requests through [employer] or call your pharmacy and they will forward the refill request to us. Please allow 3 business days for your refill to be completed.          Additional Information About Your Visit        Care EveryWhere ID     This is your Care EveryWhere ID. This could be used by other organizations to access your [place]  medical records  [id number]        Your Vitals Were     Pulse Temperature Pulse Oximetry BMI (Body Mass Index)          44 97  F (36.1  C) (Oral) 95% 30.59 kg/m2         Blood Pressure from Last 3 Encounters:   09/24/18 109/65   03/05/18 138/84   01/05/18 127/84    Weight from Last 3 Encounters:   09/24/18 204 lb 2 oz (92.6 kg)   03/05/18 195 lb (88.5 kg)   01/05/18 191 lb (86.6 kg)              We Performed the Following     CBC with platelets differential     Comprehensive metabolic panel     Hemoglobin A1c     Lipid panel reflex to direct LDL Fasting     Prostate spec antigen screen     TD (ADULT, 7+) PRESERVE FREE     TSH with free T4 reflex     VACCINE ADMINISTRATION, INITIAL     ZOSTER VACCINE RECOMBINANT ADJUVANTED IM NJX          Today's Medication Changes          These changes are accurate as of 9/24/18  9:06 AM.  If you have any questions, ask your nurse or doctor.               Stop taking these medicines if you haven't already. Please contact your care team if you have questions.     sildenafil 100 MG tablet   Commonly known as:  VIAGRA   Stopped by:  Amilcar Gaona MD           sildenafil 20 MG tablet   Commonly known as:  REVATIO   Stopped by:  Amilcar Gaona MD                Where to get your medicines      These medications were sent to [facility] [address] [place] [address] [address] [facility] [address]    Hours:  Test fax successful 12/11/02  KR Phone:  [phone]     amLODIPine 5 MG tablet    atorvastatin 10 MG tablet    lisinopril 20 MG tablet                Primary Care Provider Office Phone # Fax #    Amilcar Gaona [phone]       [address] [facility] [address]        Equal Access to Services     CECELIA SINGER : Rodrigo Adair, linn giang, sarah castrejon. So [facility] [phone].    ATENCIÓN: Salvatore simmons  español, tiene a meadows disposición servicios gratuitos de asistencia lingüística. Donald carlos [phone].    We comply with applicable federal civil rights laws and [place] laws. We do not discriminate on the basis of race, color, national origin, age, disability, sex, sexual orientation, or gender identity.            Thank you!     Thank you for choosing [facility]  for your care. Our goal is always to provide you with excellent care. Hearing back from our patients is one way we can continue to improve our services. Please take a few minutes to complete the written survey that you may receive in the mail after your visit with us. Thank you!             Your Updated Medication List - Protect others around you: Learn how to safely use, store and throw away your medicines at www.disposemymeds.org.          This list is accurate as of 9/24/18  9:06 AM.  Always use your most recent med list.                   Brand Name Dispense Instructions for use Diagnosis    amLODIPine 5 MG tablet    NORVASC    90 tablet    TAKE 1 TABLET(5 MG) BY MOUTH DAILY    Essential hypertension with goal blood pressure less than 140/90       aspirin 325 MG EC tablet      1 TABLET DAILY        atorvastatin 10 MG tablet    LIPITOR    90 tablet    Take 1 tablet (10 mg) by mouth daily    Hyperlipidemia LDL goal <130       DAILY MULTIVITAMIN PO      1 DAILY        lisinopril 20 MG tablet    PRINIVIL/ZESTRIL    90 tablet    TAKE 1 TABLET(20 MG) BY MOUTH DAILY    Essential hypertension with goal blood pressure less than 140/90

## 2018-09-25 NOTE — PROGRESS NOTES
Good news.  Prostate blood test is basically zero.    Other labs are fine.    The normal hemoglobin a1c means you do not have diabetes.    Amilcar Gaona MD

## 2018-10-15 ENCOUNTER — RADIANT APPOINTMENT (OUTPATIENT)
Dept: CT IMAGING | Facility: CLINIC | Age: 68
End: 2018-10-15
Attending: FAMILY MEDICINE
Payer: COMMERCIAL

## 2018-10-15 DIAGNOSIS — Z12.2 ENCOUNTER FOR SCREENING FOR LUNG CANCER: ICD-10-CM

## 2018-10-15 PROCEDURE — G0297 LDCT FOR LUNG CA SCREEN: HCPCS | Mod: TC

## 2018-10-15 NOTE — LETTER
[facility]   [address] [place]  [address]  [phone]                                   October 17, 2018    Brian Stephenson  [address]  [place] [address]        Dear Brian    Good lemuel.  No suspicion of lung cancer  Seen on CT.    Results for orders placed or performed in visit on 10/15/18   CT Chest Lung Cancer Scrn Low Dose wo    Narrative    CT CHEST LUNG CANCER SCREEN LOW DOSE  WITHOUT CONTRAST October 15,  2018 4:42 PM    HISTORY: Encounter for screening for lung cancer.    TECHNIQUE: Scans obtained from the apices through the diaphragm  without IV contrast. Low dose CT chest technique was utilized.  Radiation dose for this scan was reduced using automated exposure  control, adjustment of the mA and/or kV according to patient size, or  iterative reconstruction technique.    COMPARISON: Screening chest CT 10/3/2017.    FINDINGS:    Lungs: Previously noted superior segment right lower lobe ground-glass  nodule on series 6, image 116 now measures 0.4 cm, previously  measuring up to 1.0 cm in maximal diameter. Partially calcified  pleural plaque is noted along the paraspinal regions of the right and  left lung approximately at the level of the arabella. This is unchanged.  No other areas of calcified plaque are appreciated. Findings could  reflect previous asbestos exposure. No infiltrate, consolidation or  mass is otherwise evident in either lung.    Additional findings: No pleural or pericardial fluid. Heart is normal  in size. Esophagus is unremarkable. Thyroid gland appears normal in  size. Aorta is normal in caliber with scattered calcified plaque.  Multiple prominent mediastinal lymph nodes are present with a dominant  node in the precarinal region on image 24 of series 2 measuring 2.3 x  1.8 cm, previously 2.5 x 1.9 cm which is stable if not minimally  smaller. A subcarinal lymph node on image 30 now measures 2.5 x 1.5  cm, previously 3.0 x 1.5 cm. No enlarged  "axillary lymph nodes. Limited  images upper abdomen demonstrate cholecystectomy changes. Bone window  examination is within normal limits.      Impression    IMPRESSION:   1. ACR Assessment Category:  Lung-RADS Category 2. Benign appearance  or behavior. Recommendation: Continue annual screening, if clinically  relevant (please order exam code IMG 2290). .   2. Significant Incidental Finding(s): Enlarged mediastinal lymph nodes  described on prior exam have diminished slightly in size since prior  study but remain enlarged and indeterminate. Continued surveillance as  clinically warranted.   3. Areas of calcified pleural plaque possibly reflecting previous  asbestos exposure.      Download the \"LungRADS Assessment Categories\" table at this site:   http://www.acr.org/Quality-Safety/Resources/LungRADS    DIANA CURTIS MD       If you have any questions please call the clinic at [phone]    Sincerely,    Jayde Gaona MD  hnr    "

## 2018-10-16 ENCOUNTER — TELEPHONE (OUTPATIENT)
Dept: FAMILY MEDICINE | Facility: CLINIC | Age: 68
End: 2018-10-16

## 2018-12-26 ENCOUNTER — ALLIED HEALTH/NURSE VISIT (OUTPATIENT)
Dept: NURSING | Facility: CLINIC | Age: 68
End: 2018-12-26
Payer: COMMERCIAL

## 2018-12-26 DIAGNOSIS — Z23 NEED FOR SHINGLES VACCINE: Primary | ICD-10-CM

## 2018-12-26 PROCEDURE — 99207 ZZC NO CHARGE NURSE ONLY: CPT

## 2018-12-26 PROCEDURE — 90471 IMMUNIZATION ADMIN: CPT

## 2018-12-26 PROCEDURE — 90750 HZV VACC RECOMBINANT IM: CPT

## 2018-12-26 NOTE — NURSING NOTE
Screening Questionnaire for Adult Immunization    Are you sick today?   No   Do you have allergies to medications, food, a vaccine component or latex?   No   Have you ever had a serious reaction after receiving a vaccination?   No   Do you have a long-term health problem with heart disease, lung disease, asthma, kidney disease, metabolic disease (e.g. diabetes), anemia, or other blood disorder?   No   Do you have cancer, leukemia, HIV/AIDS, or any other immune system problem?   No   In the past 3 months, have you taken medications that affect  your immune system, such as prednisone, other steroids, or anticancer drugs; drugs for the treatment of rheumatoid arthritis, Crohn s disease, or psoriasis; or have you had radiation treatments?   No   Have you had a seizure, or a brain or other nervous system problem?   No   During the past year, have you received a transfusion of blood or blood     products, or been given immune (gamma) globulin or antiviral drug?   No   For women: Are you pregnant or is there a chance you could become        pregnant during the next month?   No   Have you received any vaccinations in the past 4 weeks?   No     Immunization questionnaire answers were all negative.        Per orders of Dr. Gaona, injection of second Shingrix given by Johnny Lew. Patient instructed to remain in clinic for 15 minutes afterwards, and to report any adverse reaction to me immediately.       Screening performed by Johnny Lew on 12/26/2018 at 9:28 AM.    Prior to injection, verified patient identity using patient's name and date of birth.  Due to injection administration, patient instructed to remain in clinic for 15 minutes  afterwards, and to report any adverse reaction to me immediately.    Second Shingrix    Drug Amount Wasted:  None.  Vial/Syringe: Single dose vial  Expiration Date:  02/22/2021  Johnny Lew MA on 12/26/2018 at 9:30 AM

## 2019-10-02 ENCOUNTER — OFFICE VISIT (OUTPATIENT)
Dept: FAMILY MEDICINE | Facility: CLINIC | Age: 69
End: 2019-10-02
Payer: COMMERCIAL

## 2019-10-02 VITALS
HEIGHT: 69 IN | HEART RATE: 54 BPM | TEMPERATURE: 97.2 F | BODY MASS INDEX: 31.01 KG/M2 | DIASTOLIC BLOOD PRESSURE: 70 MMHG | SYSTOLIC BLOOD PRESSURE: 119 MMHG | WEIGHT: 209.38 LBS

## 2019-10-02 DIAGNOSIS — I10 ESSENTIAL HYPERTENSION WITH GOAL BLOOD PRESSURE LESS THAN 140/90: ICD-10-CM

## 2019-10-02 DIAGNOSIS — Z23 NEED FOR PROPHYLACTIC VACCINATION AND INOCULATION AGAINST INFLUENZA: ICD-10-CM

## 2019-10-02 DIAGNOSIS — R73.01 IMPAIRED FASTING GLUCOSE: ICD-10-CM

## 2019-10-02 DIAGNOSIS — Z00.00 ENCOUNTER FOR PREVENTATIVE ADULT HEALTH CARE EXAMINATION: Primary | ICD-10-CM

## 2019-10-02 DIAGNOSIS — Z12.5 SCREENING FOR PROSTATE CANCER: ICD-10-CM

## 2019-10-02 DIAGNOSIS — E78.5 HYPERLIPIDEMIA LDL GOAL <130: ICD-10-CM

## 2019-10-02 DIAGNOSIS — R53.83 FATIGUE, UNSPECIFIED TYPE: ICD-10-CM

## 2019-10-02 LAB
ALBUMIN SERPL-MCNC: 3.9 G/DL (ref 3.4–5)
ALP SERPL-CCNC: 102 U/L (ref 40–150)
ALT SERPL W P-5'-P-CCNC: 41 U/L (ref 0–70)
ANION GAP SERPL CALCULATED.3IONS-SCNC: 8 MMOL/L (ref 3–14)
AST SERPL W P-5'-P-CCNC: 26 U/L (ref 0–45)
BASOPHILS # BLD AUTO: 0.1 10E9/L (ref 0–0.2)
BASOPHILS NFR BLD AUTO: 1.2 %
BILIRUB SERPL-MCNC: 0.7 MG/DL (ref 0.2–1.3)
BUN SERPL-MCNC: 10 MG/DL (ref 7–30)
CALCIUM SERPL-MCNC: 9.4 MG/DL (ref 8.5–10.1)
CHLORIDE SERPL-SCNC: 105 MMOL/L (ref 94–109)
CHOLEST SERPL-MCNC: 188 MG/DL
CO2 SERPL-SCNC: 28 MMOL/L (ref 20–32)
CREAT SERPL-MCNC: 0.83 MG/DL (ref 0.66–1.25)
DIFFERENTIAL METHOD BLD: NORMAL
EOSINOPHIL # BLD AUTO: 0.4 10E9/L (ref 0–0.7)
EOSINOPHIL NFR BLD AUTO: 4.6 %
ERYTHROCYTE [DISTWIDTH] IN BLOOD BY AUTOMATED COUNT: 13.9 % (ref 10–15)
GFR SERPL CREATININE-BSD FRML MDRD: 90 ML/MIN/{1.73_M2}
GLUCOSE SERPL-MCNC: 99 MG/DL (ref 70–99)
HBA1C MFR BLD: 5.5 % (ref 0–5.6)
HCT VFR BLD AUTO: 46.8 % (ref 40–53)
HDLC SERPL-MCNC: 55 MG/DL
HGB BLD-MCNC: 15.7 G/DL (ref 13.3–17.7)
LDLC SERPL CALC-MCNC: 100 MG/DL
LYMPHOCYTES # BLD AUTO: 1.3 10E9/L (ref 0.8–5.3)
LYMPHOCYTES NFR BLD AUTO: 17.1 %
MCH RBC QN AUTO: 31.1 PG (ref 26.5–33)
MCHC RBC AUTO-ENTMCNC: 33.5 G/DL (ref 31.5–36.5)
MCV RBC AUTO: 93 FL (ref 78–100)
MONOCYTES # BLD AUTO: 0.8 10E9/L (ref 0–1.3)
MONOCYTES NFR BLD AUTO: 10.2 %
NEUTROPHILS # BLD AUTO: 5.2 10E9/L (ref 1.6–8.3)
NEUTROPHILS NFR BLD AUTO: 66.9 %
NONHDLC SERPL-MCNC: 133 MG/DL
PLATELET # BLD AUTO: 232 10E9/L (ref 150–450)
POTASSIUM SERPL-SCNC: 4.3 MMOL/L (ref 3.4–5.3)
PROT SERPL-MCNC: 7.4 G/DL (ref 6.8–8.8)
RBC # BLD AUTO: 5.05 10E12/L (ref 4.4–5.9)
SODIUM SERPL-SCNC: 141 MMOL/L (ref 133–144)
TRIGL SERPL-MCNC: 164 MG/DL
TSH SERPL DL<=0.005 MIU/L-ACNC: 2.52 MU/L (ref 0.4–4)
WBC # BLD AUTO: 7.8 10E9/L (ref 4–11)

## 2019-10-02 PROCEDURE — 83036 HEMOGLOBIN GLYCOSYLATED A1C: CPT | Performed by: FAMILY MEDICINE

## 2019-10-02 PROCEDURE — 90662 IIV NO PRSV INCREASED AG IM: CPT | Performed by: FAMILY MEDICINE

## 2019-10-02 PROCEDURE — G0103 PSA SCREENING: HCPCS | Performed by: FAMILY MEDICINE

## 2019-10-02 PROCEDURE — G0008 ADMIN INFLUENZA VIRUS VAC: HCPCS | Performed by: FAMILY MEDICINE

## 2019-10-02 PROCEDURE — 80061 LIPID PANEL: CPT | Performed by: FAMILY MEDICINE

## 2019-10-02 PROCEDURE — 85025 COMPLETE CBC W/AUTO DIFF WBC: CPT | Performed by: FAMILY MEDICINE

## 2019-10-02 PROCEDURE — 80053 COMPREHEN METABOLIC PANEL: CPT | Performed by: FAMILY MEDICINE

## 2019-10-02 PROCEDURE — 36415 COLL VENOUS BLD VENIPUNCTURE: CPT | Performed by: FAMILY MEDICINE

## 2019-10-02 PROCEDURE — 84443 ASSAY THYROID STIM HORMONE: CPT | Performed by: FAMILY MEDICINE

## 2019-10-02 PROCEDURE — 99397 PER PM REEVAL EST PAT 65+ YR: CPT | Mod: 25 | Performed by: FAMILY MEDICINE

## 2019-10-02 RX ORDER — ATORVASTATIN CALCIUM 10 MG/1
10 TABLET, FILM COATED ORAL DAILY
Qty: 90 TABLET | Refills: 3 | Status: SHIPPED | OUTPATIENT
Start: 2019-10-02 | End: 2020-09-21

## 2019-10-02 RX ORDER — AMLODIPINE BESYLATE 5 MG/1
TABLET ORAL
Qty: 90 TABLET | Refills: 3 | Status: SHIPPED | OUTPATIENT
Start: 2019-10-02 | End: 2020-09-21

## 2019-10-02 RX ORDER — LISINOPRIL 20 MG/1
TABLET ORAL
Qty: 90 TABLET | Refills: 3 | Status: SHIPPED | OUTPATIENT
Start: 2019-10-02 | End: 2020-09-21

## 2019-10-02 ASSESSMENT — ENCOUNTER SYMPTOMS
JOINT SWELLING: 0
SORE THROAT: 0
FREQUENCY: 0
SHORTNESS OF BREATH: 0
HEARTBURN: 0
COUGH: 0
NAUSEA: 0
FEVER: 0
HEMATURIA: 0
PARESTHESIAS: 0
HEMATOCHEZIA: 0
DIARRHEA: 0
DIZZINESS: 0
CHILLS: 0
ARTHRALGIAS: 0
ABDOMINAL PAIN: 0
MYALGIAS: 0
NERVOUS/ANXIOUS: 0
HEADACHES: 0
EYE PAIN: 0
DYSURIA: 0
PALPITATIONS: 0
CONSTIPATION: 0

## 2019-10-02 ASSESSMENT — PAIN SCALES - GENERAL: PAINLEVEL: NO PAIN (0)

## 2019-10-02 ASSESSMENT — ACTIVITIES OF DAILY LIVING (ADL): CURRENT_FUNCTION: NO ASSISTANCE NEEDED

## 2019-10-02 ASSESSMENT — MIFFLIN-ST. JEOR: SCORE: 1702.22

## 2019-10-02 NOTE — LETTER
"[facility]   [address] [place]  [address]  [phone]                                   October 4, 2019    Brian Stephenson  [address]  [place] [address]        Dear Brian,    The triglycerides and LDL \"bad\" cholesterol are higher than last year, but not worrisome.  Stay on the atorvastatin and keep working on healthy diet/exercise.    Other labs are fine.    Have a good year!    Results for orders placed or performed in visit on 10/02/19   Lipid panel reflex to direct LDL Fasting   Result Value Ref Range    Cholesterol 188 <200 mg/dL    Triglycerides 164 (H) <150 mg/dL    HDL Cholesterol 55 >39 mg/dL    LDL Cholesterol Calculated 100 (H) <100 mg/dL    Non HDL Cholesterol 133 (H) <130 mg/dL   Comprehensive metabolic panel   Result Value Ref Range    Sodium 141 133 - 144 mmol/L    Potassium 4.3 3.4 - 5.3 mmol/L    Chloride 105 94 - 109 mmol/L    Carbon Dioxide 28 20 - 32 mmol/L    Anion Gap 8 3 - 14 mmol/L    Glucose 99 70 - 99 mg/dL    Urea Nitrogen 10 7 - 30 mg/dL    Creatinine 0.83 0.66 - 1.25 mg/dL    GFR Estimate 90 >60 mL/min/[1.73_m2]    GFR Estimate If Black >90 >60 mL/min/[1.73_m2]    Calcium 9.4 8.5 - 10.1 mg/dL    Bilirubin Total 0.7 0.2 - 1.3 mg/dL    Albumin 3.9 3.4 - 5.0 g/dL    Protein Total 7.4 6.8 - 8.8 g/dL    Alkaline Phosphatase 102 40 - 150 U/L    ALT 41 0 - 70 U/L    AST 26 0 - 45 U/L   Hemoglobin A1c   Result Value Ref Range    Hemoglobin A1C 5.5 0 - 5.6 %   Prostate spec antigen screen   Result Value Ref Range    PSA <0.01 0 - 4 ug/L   TSH with free T4 reflex   Result Value Ref Range    TSH 2.52 0.40 - 4.00 mU/L   CBC with platelets differential   Result Value Ref Range    WBC 7.8 4.0 - 11.0 10e9/L    RBC Count 5.05 4.4 - 5.9 10e12/L    Hemoglobin 15.7 13.3 - 17.7 g/dL    Hematocrit 46.8 40.0 - 53.0 %    MCV 93 78 - 100 fl    MCH 31.1 26.5 - 33.0 pg    MCHC 33.5 31.5 - 36.5 g/dL    RDW 13.9 10.0 - 15.0 %    Platelet Count 232 150 - 450 10e9/L    " % Neutrophils 66.9 %    % Lymphocytes 17.1 %    % Monocytes 10.2 %    % Eosinophils 4.6 %    % Basophils 1.2 %    Absolute Neutrophil 5.2 1.6 - 8.3 10e9/L    Absolute Lymphocytes 1.3 0.8 - 5.3 10e9/L    Absolute Monocytes 0.8 0.0 - 1.3 10e9/L    Absolute Eosinophils 0.4 0.0 - 0.7 10e9/L    Absolute Basophils 0.1 0.0 - 0.2 10e9/L    Diff Method Automated Method        If you have any questions please call the clinic at [phone]    Sincerely,    Amilcar Gaona MD  hnr

## 2019-10-02 NOTE — PROGRESS NOTES
"SUBJECTIVE:   Brian Stephenson is a 69 year old male who presents for Preventive Visit.      Are you in the first 12 months of your Medicare coverage?  No    Healthy Habits:     In general, how would you rate your overall health?  Good    Frequency of exercise:  1 day/week    Duration of exercise:  15-30 minutes    Do you usually eat at least 4 servings of fruit and vegetables a day, include whole grains    & fiber and avoid regularly eating high fat or \"junk\" foods?  No    Taking medications regularly:  Yes    Medication side effects:  None    Ability to successfully perform activities of daily living:  No assistance needed    Home Safety:  No safety concerns identified    Hearing Impairment:  No hearing concerns    In the past 6 months, have you been bothered by leaking of urine?  No    In general, how would you rate your overall mental or emotional health?  Excellent      PHQ-2 Total Score: 0    Additional concerns today:  No    Do you feel safe in your environment? Yes    Do you have a Health Care Directive? Yes: Advance Directive has been received and scanned.      Fall risk  Fallen 2 or more times in the past year?: No  Any fall with injury in the past year?: No    Cognitive Screening   1) Repeat 3 items (Leader, Season, Table)     2) Clock draw:  NORMAL  3) 3 item recall:  Recalls 2 objects   Results: NORMAL clock, 1-2 items recalled: COGNITIVE IMPAIRMENT LESS LIKELY    Mini-CogTM Copyright SANJAY Ayala. Licensed by the author for use in [facility]; reprinted with permission ([email or web address]@.[email or web address]). All rights reserved.      Do you have sleep apnea, excessive snoring or daytime drowsiness?: no    Reviewed and updated as needed this visit by clinical staff  Tobacco  Allergies  Meds  Med Hx  Surg Hx  Fam Hx  Soc Hx        Reviewed and updated as needed this visit by Provider        Social History     Tobacco Use     Smoking status: Former Smoker     Packs/day: 0.00     Last attempt to quit: 1/1/1997     Years " since quittin.7     Smokeless tobacco: Never Used   Substance Use Topics     Alcohol use: Yes     Comment: rare     If you drink alcohol do you typically have >3 drinks per day or >7 drinks per week? No    Alcohol Use 10/2/2019   Prescreen: >3 drinks/day or >7 drinks/week? No   Prescreen: >3 drinks/day or >7 drinks/week? -   No flowsheet data found.        none    Current providers sharing in care for this patient include:   Patient Care Team:  Amilcar Gaona MD as PCP - General  Amilcar Gaona MD as Assigned PCP    The following health maintenance items are reviewed in Epic and correct as of today:  Health Maintenance   Topic Date Due     AORTIC ANEURYSM SCREENING (SYSTEM ASSIGNED)  2016     FALL RISK ASSESSMENT  2019     MEDICARE ANNUAL WELLNESS VISIT  2019     ADVANCE CARE PLANNING  2021     COLONOSCOPY  2023     LIPID  2023     DTAP/TDAP/TD IMMUNIZATION (3 - Td) 2028     HEPATITIS C SCREENING  Completed     PHQ-2  Completed     INFLUENZA VACCINE  Completed     PNEUMOCOCCAL IMMUNIZATION 65+ LOW/MEDIUM RISK  Completed     ZOSTER IMMUNIZATION  Completed     IPV IMMUNIZATION  Aged Out     MENINGITIS IMMUNIZATION  Aged Out             Review of Systems   Constitutional: Negative for chills and fever.   HENT: Negative for congestion, ear pain, hearing loss and sore throat.    Eyes: Negative for pain and visual disturbance.   Respiratory: Negative for cough and shortness of breath.    Cardiovascular: Negative for chest pain, palpitations and peripheral edema.   Gastrointestinal: Negative for abdominal pain, constipation, diarrhea, heartburn, hematochezia and nausea.   Genitourinary: Positive for impotence. Negative for discharge, dysuria, frequency, genital sores, hematuria and urgency.   Musculoskeletal: Negative for arthralgias, joint swelling and myalgias.   Skin: Negative for rash.   Neurological: Negative for dizziness, headaches and paresthesias.  "  Psychiatric/Behavioral: Negative for mood changes. The patient is not nervous/anxious.       retired last December    Some walking     Joined [employer], goes some    Some walking    Urinating fine    No leakage        OBJECTIVE:   /70 (BP Location: Left arm, Patient Position: Chair, Cuff Size: Adult Regular)   Pulse 54   Temp 97.2  F (36.2  C) (Oral)   Ht 1.74 m (5' 8.5\")   Wt 95 kg (209 lb 6 oz)   BMI 31.37 kg/m   Estimated body mass index is 31.37 kg/m  as calculated from the following:    Height as of this encounter: 1.74 m (5' 8.5\").    Weight as of this encounter: 95 kg (209 lb 6 oz).  Physical Exam  GENERAL: healthy, alert and no distress  EYES: Eyes grossly normal to inspection, PERRL and conjunctivae and sclerae normal  HENT: ear canals and TM's normal, nose and mouth without ulcers or lesions  NECK: no adenopathy, no asymmetry, masses, or scars and thyroid normal to palpation  RESP: lungs clear to auscultation - no rales, rhonchi or wheezes  CV: regular rate and rhythm, normal S1 S2, no S3 or S4, no murmur, click or rub, no peripheral edema and peripheral pulses strong  ABDOMEN: soft, nontender, no hepatosplenomegaly, no masses and bowel sounds normal  MS: no gross musculoskeletal defects noted, no edema  SKIN: no suspicious lesions or rashes  NEURO: Normal strength and tone, mentation intact and speech normal  PSYCH: mentation appears normal, affect normal/bright    Diagnostic Test Results:  Labs reviewed in Epic    ASSESSMENT / PLAN:   Brian was seen today for wellness visit, health maintenance and imm/inj.    Diagnoses and all orders for this visit:    Encounter for preventative adult health care examination    Essential hypertension with goal blood pressure less than 140/90  -     amLODIPine (NORVASC) 5 MG tablet; TAKE 1 TABLET(5 MG) BY MOUTH DAILY  -     lisinopril (PRINIVIL/ZESTRIL) 20 MG tablet; TAKE 1 TABLET(20 MG) BY MOUTH DAILY    Hyperlipidemia LDL goal <130  -     atorvastatin " "(LIPITOR) 10 MG tablet; Take 1 tablet (10 mg) by mouth daily  -     Lipid panel reflex to direct LDL Fasting  -     Comprehensive metabolic panel    Need for prophylactic vaccination and inoculation against influenza  -     INFLUENZA (HIGH DOSE) 3 VALENT VACCINE [98856]  -     ADMIN INFLUENZA (For MEDICARE Patients ONLY) []    Screening for prostate cancer  -     Prostate spec antigen screen    Impaired fasting glucose  -     Hemoglobin A1c    Fatigue, unspecified type  -     TSH with free T4 reflex  -     CBC with platelets differential    Overall patient stable  Keep working on healthy diet/exercise and wt loss  Refill meds  Check labs   Flu shot given  Up to date on colonoscopy      End of Life Planning:  Patient currently has an advanced directive: Yes.  Practitioner is supportive of decision.    COUNSELING:  Reviewed preventive health counseling, as reflected in patient instructions       Consider AAA screening for ages 65-75 and smoking history       Regular exercise       Healthy diet/nutrition       Vision screening       Dental care       Colon cancer screening       Prostate cancer screening    Estimated body mass index is 31.37 kg/m  as calculated from the following:    Height as of this encounter: 1.74 m (5' 8.5\").    Weight as of this encounter: 95 kg (209 lb 6 oz).    Weight management plan: Discussed healthy diet and exercise guidelines     reports that he quit smoking about 22 years ago. He smoked 0.00 packs per day. He has never used smokeless tobacco.      Appropriate preventive services were discussed with this patient, including applicable screening as appropriate for cardiovascular disease, diabetes, osteopenia/osteoporosis, and glaucoma.  As appropriate for age/gender, discussed screening for colorectal cancer, prostate cancer, breast cancer, and cervical cancer. Checklist reviewing preventive services available has been given to the patient.    Reviewed patients plan of care and provided " an AVS. The Basic Care Plan (routine screening as documented in Health Maintenance) for Brian meets the Care Plan requirement. This Care Plan has been established and reviewed with the Patient.    Counseling Resources:  ATP IV Guidelines  Pooled Cohorts Equation Calculator  Breast Cancer Risk Calculator  FRAX Risk Assessment  ICSI Preventive Guidelines  Dietary Guidelines for Americans, 2010  USDA's MyPlate  ASA Prophylaxis  Lung CA Screening    Amilcar Gaona MD  [facility]    Identified Health Risks:

## 2019-10-02 NOTE — PATIENT INSTRUCTIONS
Keep working on healthy diet/exercise and wt loss    We will send you lab results    Call with problems/ questions

## 2019-10-03 LAB — PSA SERPL-ACNC: <0.01 UG/L (ref 0–4)

## 2019-10-03 NOTE — RESULT ENCOUNTER NOTE
"The triglycerides and LDL \"bad\" cholesterol are higher than last year, but not worrisome.  Stay on the atorvastatin and keep working on healthy diet/exercise.    Other labs are fine.    Have a good year!    Amilcar Gaona MD  "

## 2020-07-23 ENCOUNTER — TRANSFERRED RECORDS (OUTPATIENT)
Dept: HEALTH INFORMATION MANAGEMENT | Facility: CLINIC | Age: 70
End: 2020-07-23

## 2020-09-21 ENCOUNTER — OFFICE VISIT (OUTPATIENT)
Dept: FAMILY MEDICINE | Facility: CLINIC | Age: 70
End: 2020-09-21
Payer: COMMERCIAL

## 2020-09-21 VITALS
HEIGHT: 69 IN | DIASTOLIC BLOOD PRESSURE: 74 MMHG | SYSTOLIC BLOOD PRESSURE: 138 MMHG | TEMPERATURE: 97.7 F | HEART RATE: 54 BPM | WEIGHT: 206 LBS | BODY MASS INDEX: 30.51 KG/M2

## 2020-09-21 DIAGNOSIS — E55.9 VITAMIN D DEFICIENCY DISEASE: ICD-10-CM

## 2020-09-21 DIAGNOSIS — Z00.00 ENCOUNTER FOR MEDICARE ANNUAL WELLNESS EXAM: ICD-10-CM

## 2020-09-21 DIAGNOSIS — Z00.00 ROUTINE GENERAL MEDICAL EXAMINATION AT A HEALTH CARE FACILITY: Primary | ICD-10-CM

## 2020-09-21 DIAGNOSIS — E78.5 HYPERLIPIDEMIA LDL GOAL <100: ICD-10-CM

## 2020-09-21 DIAGNOSIS — I10 ESSENTIAL HYPERTENSION WITH GOAL BLOOD PRESSURE LESS THAN 140/90: ICD-10-CM

## 2020-09-21 DIAGNOSIS — R59.9 ENLARGED LYMPH NODES: ICD-10-CM

## 2020-09-21 DIAGNOSIS — R93.89 ABNORMAL CT OF THE CHEST: ICD-10-CM

## 2020-09-21 DIAGNOSIS — E78.5 HYPERLIPIDEMIA LDL GOAL <130: ICD-10-CM

## 2020-09-21 DIAGNOSIS — R73.01 IMPAIRED FASTING GLUCOSE: ICD-10-CM

## 2020-09-21 DIAGNOSIS — R53.83 FATIGUE, UNSPECIFIED TYPE: ICD-10-CM

## 2020-09-21 DIAGNOSIS — Z12.5 SCREENING FOR PROSTATE CANCER: ICD-10-CM

## 2020-09-21 LAB
ALBUMIN SERPL-MCNC: 3.9 G/DL (ref 3.4–5)
ALP SERPL-CCNC: 93 U/L (ref 40–150)
ALT SERPL W P-5'-P-CCNC: 37 U/L (ref 0–70)
ANION GAP SERPL CALCULATED.3IONS-SCNC: 6 MMOL/L (ref 3–14)
AST SERPL W P-5'-P-CCNC: 20 U/L (ref 0–45)
BASOPHILS # BLD AUTO: 0.1 10E9/L (ref 0–0.2)
BASOPHILS NFR BLD AUTO: 1.5 %
BILIRUB SERPL-MCNC: 1 MG/DL (ref 0.2–1.3)
BUN SERPL-MCNC: 9 MG/DL (ref 7–30)
CALCIUM SERPL-MCNC: 8.9 MG/DL (ref 8.5–10.1)
CHLORIDE SERPL-SCNC: 110 MMOL/L (ref 94–109)
CHOLEST SERPL-MCNC: 163 MG/DL
CO2 SERPL-SCNC: 25 MMOL/L (ref 20–32)
CREAT SERPL-MCNC: 0.84 MG/DL (ref 0.66–1.25)
DIFFERENTIAL METHOD BLD: NORMAL
EOSINOPHIL # BLD AUTO: 0.3 10E9/L (ref 0–0.7)
EOSINOPHIL NFR BLD AUTO: 4.2 %
ERYTHROCYTE [DISTWIDTH] IN BLOOD BY AUTOMATED COUNT: 13.4 % (ref 10–15)
GFR SERPL CREATININE-BSD FRML MDRD: 89 ML/MIN/{1.73_M2}
GLUCOSE SERPL-MCNC: 95 MG/DL (ref 70–99)
HBA1C MFR BLD: 5.6 % (ref 0–5.6)
HCT VFR BLD AUTO: 43.9 % (ref 40–53)
HDLC SERPL-MCNC: 60 MG/DL
HGB BLD-MCNC: 15.2 G/DL (ref 13.3–17.7)
LDLC SERPL CALC-MCNC: 82 MG/DL
LYMPHOCYTES # BLD AUTO: 1.6 10E9/L (ref 0.8–5.3)
LYMPHOCYTES NFR BLD AUTO: 23.2 %
MCH RBC QN AUTO: 31.1 PG (ref 26.5–33)
MCHC RBC AUTO-ENTMCNC: 34.6 G/DL (ref 31.5–36.5)
MCV RBC AUTO: 90 FL (ref 78–100)
MONOCYTES # BLD AUTO: 0.7 10E9/L (ref 0–1.3)
MONOCYTES NFR BLD AUTO: 9.9 %
NEUTROPHILS # BLD AUTO: 4.1 10E9/L (ref 1.6–8.3)
NEUTROPHILS NFR BLD AUTO: 61.2 %
NONHDLC SERPL-MCNC: 103 MG/DL
PLATELET # BLD AUTO: 267 10E9/L (ref 150–450)
POTASSIUM SERPL-SCNC: 3.7 MMOL/L (ref 3.4–5.3)
PROT SERPL-MCNC: 7.1 G/DL (ref 6.8–8.8)
RBC # BLD AUTO: 4.89 10E12/L (ref 4.4–5.9)
SODIUM SERPL-SCNC: 141 MMOL/L (ref 133–144)
TRIGL SERPL-MCNC: 105 MG/DL
TSH SERPL DL<=0.005 MIU/L-ACNC: 2.47 MU/L (ref 0.4–4)
WBC # BLD AUTO: 6.7 10E9/L (ref 4–11)

## 2020-09-21 PROCEDURE — 82306 VITAMIN D 25 HYDROXY: CPT | Performed by: FAMILY MEDICINE

## 2020-09-21 PROCEDURE — 85025 COMPLETE CBC W/AUTO DIFF WBC: CPT | Performed by: FAMILY MEDICINE

## 2020-09-21 PROCEDURE — 80061 LIPID PANEL: CPT | Performed by: FAMILY MEDICINE

## 2020-09-21 PROCEDURE — 84443 ASSAY THYROID STIM HORMONE: CPT | Performed by: FAMILY MEDICINE

## 2020-09-21 PROCEDURE — 99213 OFFICE O/P EST LOW 20 MIN: CPT | Mod: 25 | Performed by: FAMILY MEDICINE

## 2020-09-21 PROCEDURE — 99397 PER PM REEVAL EST PAT 65+ YR: CPT | Performed by: FAMILY MEDICINE

## 2020-09-21 PROCEDURE — G0103 PSA SCREENING: HCPCS | Performed by: FAMILY MEDICINE

## 2020-09-21 PROCEDURE — 80053 COMPREHEN METABOLIC PANEL: CPT | Performed by: FAMILY MEDICINE

## 2020-09-21 PROCEDURE — 36415 COLL VENOUS BLD VENIPUNCTURE: CPT | Performed by: FAMILY MEDICINE

## 2020-09-21 PROCEDURE — 83036 HEMOGLOBIN GLYCOSYLATED A1C: CPT | Performed by: FAMILY MEDICINE

## 2020-09-21 RX ORDER — AMLODIPINE BESYLATE 5 MG/1
TABLET ORAL
Qty: 90 TABLET | Refills: 3 | Status: SHIPPED | OUTPATIENT
Start: 2020-09-21 | End: 2021-09-24

## 2020-09-21 RX ORDER — ATORVASTATIN CALCIUM 10 MG/1
10 TABLET, FILM COATED ORAL DAILY
Qty: 90 TABLET | Refills: 3 | Status: SHIPPED | OUTPATIENT
Start: 2020-09-21 | End: 2021-09-24

## 2020-09-21 RX ORDER — LISINOPRIL 20 MG/1
TABLET ORAL
Qty: 90 TABLET | Refills: 3 | Status: SHIPPED | OUTPATIENT
Start: 2020-09-21 | End: 2021-09-24

## 2020-09-21 ASSESSMENT — ENCOUNTER SYMPTOMS
ABDOMINAL PAIN: 0
HEMATURIA: 0
DIZZINESS: 0
DIARRHEA: 0
HEMATOCHEZIA: 0
CONSTIPATION: 0
CHILLS: 0
COUGH: 0

## 2020-09-21 ASSESSMENT — ACTIVITIES OF DAILY LIVING (ADL): CURRENT_FUNCTION: NO ASSISTANCE NEEDED

## 2020-09-21 ASSESSMENT — PAIN SCALES - GENERAL: PAINLEVEL: NO PAIN (0)

## 2020-09-21 ASSESSMENT — MIFFLIN-ST. JEOR: SCORE: 1681.91

## 2020-09-21 NOTE — PATIENT INSTRUCTIONS
We  Will send lab results    Stay on same meds for now    Keep working on healthy diet/exercise and wt loss    Call and schedule CT chest  Patient Education   Personalized Prevention Plan  You are due for the preventive services outlined below.  Your care team is available to assist you in scheduling these services.  If you have already completed any of these items, please share that information with your care team to update in your medical record.  Health Maintenance Due   Topic Date Due     AORTIC ANEURYSM SCREENING (SYSTEM ASSIGNED)  02/02/2016     PHQ-2  01/01/2020     Flu Vaccine (1) 09/01/2020     Annual Wellness Visit  10/02/2020     FALL RISK ASSESSMENT  10/02/2020

## 2020-09-21 NOTE — LETTER
[facility]   [address] [place]  [address]  [phone]                                   September 23, 2020    Brian Stephenson  [address]  [place] [address]        Dear Brian,    These labs are all fine     Results for orders placed or performed in visit on 09/21/20   Lipid panel reflex to direct LDL Fasting     Status: None   Result Value Ref Range    Cholesterol 163 <200 mg/dL    Triglycerides 105 <150 mg/dL    HDL Cholesterol 60 >39 mg/dL    LDL Cholesterol Calculated 82 <100 mg/dL    Non HDL Cholesterol 103 <130 mg/dL   Comprehensive metabolic panel     Status: Abnormal   Result Value Ref Range    Sodium 141 133 - 144 mmol/L    Potassium 3.7 3.4 - 5.3 mmol/L    Chloride 110 (H) 94 - 109 mmol/L    Carbon Dioxide 25 20 - 32 mmol/L    Anion Gap 6 3 - 14 mmol/L    Glucose 95 70 - 99 mg/dL    Urea Nitrogen 9 7 - 30 mg/dL    Creatinine 0.84 0.66 - 1.25 mg/dL    GFR Estimate 89 >60 mL/min/[1.73_m2]    GFR Estimate If Black >90 >60 mL/min/[1.73_m2]    Calcium 8.9 8.5 - 10.1 mg/dL    Bilirubin Total 1.0 0.2 - 1.3 mg/dL    Albumin 3.9 3.4 - 5.0 g/dL    Protein Total 7.1 6.8 - 8.8 g/dL    Alkaline Phosphatase 93 40 - 150 U/L    ALT 37 0 - 70 U/L    AST 20 0 - 45 U/L   TSH with free T4 reflex     Status: None   Result Value Ref Range    TSH 2.47 0.40 - 4.00 mU/L   CBC with platelets differential     Status: None   Result Value Ref Range    WBC 6.7 4.0 - 11.0 10e9/L    RBC Count 4.89 4.4 - 5.9 10e12/L    Hemoglobin 15.2 13.3 - 17.7 g/dL    Hematocrit 43.9 40.0 - 53.0 %    MCV 90 78 - 100 fl    MCH 31.1 26.5 - 33.0 pg    MCHC 34.6 31.5 - 36.5 g/dL    RDW 13.4 10.0 - 15.0 %    Platelet Count 267 150 - 450 10e9/L    % Neutrophils 61.2 %    % Lymphocytes 23.2 %    % Monocytes 9.9 %    % Eosinophils 4.2 %    % Basophils 1.5 %    Absolute Neutrophil 4.1 1.6 - 8.3 10e9/L    Absolute Lymphocytes 1.6 0.8 - 5.3 10e9/L    Absolute Monocytes 0.7 0.0 - 1.3 10e9/L    Absolute Eosinophils  0.3 0.0 - 0.7 10e9/L    Absolute Basophils 0.1 0.0 - 0.2 10e9/L    Diff Method Automated Method    Vitamin D Deficiency     Status: None   Result Value Ref Range    Vitamin D Deficiency screening 65 20 - 75 ug/L   Hemoglobin A1c     Status: None   Result Value Ref Range    Hemoglobin A1C 5.6 0 - 5.6 %   Prostate spec antigen screen     Status: None   Result Value Ref Range    PSA <0.01 0 - 4 ug/L       If you have any questions please call the clinic at [phone]    Sincerely,    Amilcar Gaona MD  hnr

## 2020-09-22 LAB
DEPRECATED CALCIDIOL+CALCIFEROL SERPL-MC: 65 UG/L (ref 20–75)
PSA SERPL-ACNC: <0.01 UG/L (ref 0–4)

## 2020-09-30 ENCOUNTER — ANCILLARY PROCEDURE (OUTPATIENT)
Dept: CT IMAGING | Facility: CLINIC | Age: 70
End: 2020-09-30
Attending: FAMILY MEDICINE
Payer: COMMERCIAL

## 2020-09-30 DIAGNOSIS — R93.89 ABNORMAL CT OF THE CHEST: ICD-10-CM

## 2020-09-30 DIAGNOSIS — R59.9 ENLARGED LYMPH NODES: ICD-10-CM

## 2020-09-30 PROCEDURE — 71250 CT THORAX DX C-: CPT | Mod: TC

## 2020-09-30 NOTE — LETTER
[facility]   [address] [place]  [address]  [phone]                                   October 1, 2020    Brian Stephenson  [address]  [place] [address]        Dear Brian,    Here is the CT report I called about     Not worrisome     Stable since  2017     Results for orders placed or performed in visit on 09/30/20   CT Chest w/o Contrast     Status: None    Narrative    CT CHEST WITHOUT CONTRAST 9/30/2020 2:19 PM    CLINICAL HISTORY: Mediastinal lymph nodes on past CT scan 2018;  radiology advised surveillance. Enlarged lymph nodes. Abnormal CT of  the chest.    TECHNIQUE: CT chest without IV contrast. Multiplanar reformats were  obtained. Dose reduction techniques were used.  CONTRAST: None.    COMPARISON: CT chest 12/15/2018, 10/3/2017.    FINDINGS:   LUNGS AND PLEURA: No acute abnormality. No effusion. Stable 1 cm  nodule posteromedial right upper lobe immediately adjacent to the  right main bronchus, series 4 image 139. This appears stable when  remeasuring the examinations from 2018 and 2017 at a comparable level.  Stable right fissural nodule versus lymph node that is 0.4 cm, image  191. No new worrisome airspace disease. A few calcified pleural  plaques appear stable and may relate to prior asbestos exposure.    MEDIASTINUM/AXILLAE: Stable mildly prominent lymph node at the  precarinal location measuring 2.4 x 1.8 cm, series 2 image 28. There  are other bilateral mildly prominent mediastinal lymph nodes that also  appear to be stable at several locations. No acute mediastinal  abnormality. Mild descending thoracic aortic calcifications.    UPPER ABDOMEN: Cholecystectomy. No acute abnormality.    MUSCULOSKELETAL: Unremarkable.      Impression    IMPRESSION:   1.  Stable pulmonary nodules dating back to 10/3/2017 most consistent  with a nonaggressive etiology. No new worrisome airspace disease.  2.  Several enlarged bilateral mediastinal lymph nodes are  stable.    ERYN ARENAS MD       If you have any questions please call the clinic at [phone]    Sincerely,    Amilcar Gaona MD  bmd

## 2020-10-01 ENCOUNTER — TELEPHONE (OUTPATIENT)
Dept: FAMILY MEDICINE | Facility: CLINIC | Age: 70
End: 2020-10-01

## 2021-03-03 ENCOUNTER — IMMUNIZATION (OUTPATIENT)
Dept: NURSING | Facility: CLINIC | Age: 71
End: 2021-03-03
Payer: COMMERCIAL

## 2021-03-03 PROCEDURE — 91300 PR COVID VAC PFIZER DIL RECON 30 MCG/0.3 ML IM: CPT

## 2021-03-03 PROCEDURE — 0001A PR COVID VAC PFIZER DIL RECON 30 MCG/0.3 ML IM: CPT

## 2021-03-24 ENCOUNTER — IMMUNIZATION (OUTPATIENT)
Dept: NURSING | Facility: CLINIC | Age: 71
End: 2021-03-24
Attending: FAMILY MEDICINE
Payer: COMMERCIAL

## 2021-03-24 PROCEDURE — 0002A PR COVID VAC PFIZER DIL RECON 30 MCG/0.3 ML IM: CPT

## 2021-03-24 PROCEDURE — 91300 PR COVID VAC PFIZER DIL RECON 30 MCG/0.3 ML IM: CPT

## 2021-09-23 ASSESSMENT — ENCOUNTER SYMPTOMS
DIARRHEA: 0
HEARTBURN: 0
COUGH: 0
HEADACHES: 0
CHILLS: 0
MYALGIAS: 0
ARTHRALGIAS: 0
NERVOUS/ANXIOUS: 0
FREQUENCY: 0
PALPITATIONS: 0
WEAKNESS: 0
ABDOMINAL PAIN: 0
DYSURIA: 0
DIZZINESS: 0
HEMATURIA: 0
CONSTIPATION: 0
SORE THROAT: 0
EYE PAIN: 0
PARESTHESIAS: 0
NAUSEA: 0
JOINT SWELLING: 0
FEVER: 0
SHORTNESS OF BREATH: 0
HEMATOCHEZIA: 0

## 2021-09-23 ASSESSMENT — ACTIVITIES OF DAILY LIVING (ADL): CURRENT_FUNCTION: NO ASSISTANCE NEEDED

## 2021-09-24 ENCOUNTER — OFFICE VISIT (OUTPATIENT)
Dept: FAMILY MEDICINE | Facility: CLINIC | Age: 71
End: 2021-09-24
Payer: COMMERCIAL

## 2021-09-24 VITALS
DIASTOLIC BLOOD PRESSURE: 76 MMHG | SYSTOLIC BLOOD PRESSURE: 131 MMHG | BODY MASS INDEX: 29.99 KG/M2 | HEART RATE: 42 BPM | WEIGHT: 202.5 LBS | HEIGHT: 69 IN | TEMPERATURE: 97.1 F

## 2021-09-24 DIAGNOSIS — F52.21 MALE ERECTILE DISORDER (CODE): ICD-10-CM

## 2021-09-24 DIAGNOSIS — E78.5 HYPERLIPIDEMIA LDL GOAL <130: ICD-10-CM

## 2021-09-24 DIAGNOSIS — R73.01 IMPAIRED FASTING GLUCOSE: ICD-10-CM

## 2021-09-24 DIAGNOSIS — E55.9 VITAMIN D DEFICIENCY DISEASE: ICD-10-CM

## 2021-09-24 DIAGNOSIS — I10 ESSENTIAL HYPERTENSION WITH GOAL BLOOD PRESSURE LESS THAN 140/90: ICD-10-CM

## 2021-09-24 DIAGNOSIS — Z12.5 SCREENING FOR PROSTATE CANCER: ICD-10-CM

## 2021-09-24 DIAGNOSIS — R53.83 FATIGUE, UNSPECIFIED TYPE: ICD-10-CM

## 2021-09-24 DIAGNOSIS — Z00.00 ROUTINE GENERAL MEDICAL EXAMINATION AT A HEALTH CARE FACILITY: Primary | ICD-10-CM

## 2021-09-24 LAB
ALBUMIN SERPL-MCNC: 3.8 G/DL (ref 3.4–5)
ALP SERPL-CCNC: 85 U/L (ref 40–150)
ALT SERPL W P-5'-P-CCNC: 30 U/L (ref 0–70)
ANION GAP SERPL CALCULATED.3IONS-SCNC: 5 MMOL/L (ref 3–14)
AST SERPL W P-5'-P-CCNC: 20 U/L (ref 0–45)
BASOPHILS # BLD AUTO: 0.1 10E3/UL (ref 0–0.2)
BASOPHILS NFR BLD AUTO: 1 %
BILIRUB SERPL-MCNC: 1.1 MG/DL (ref 0.2–1.3)
BUN SERPL-MCNC: 11 MG/DL (ref 7–30)
CALCIUM SERPL-MCNC: 9 MG/DL (ref 8.5–10.1)
CHLORIDE BLD-SCNC: 107 MMOL/L (ref 94–109)
CHOLEST SERPL-MCNC: 176 MG/DL
CO2 SERPL-SCNC: 29 MMOL/L (ref 20–32)
CREAT SERPL-MCNC: 0.91 MG/DL (ref 0.66–1.25)
EOSINOPHIL # BLD AUTO: 0.3 10E3/UL (ref 0–0.7)
EOSINOPHIL NFR BLD AUTO: 4 %
ERYTHROCYTE [DISTWIDTH] IN BLOOD BY AUTOMATED COUNT: 14.1 % (ref 10–15)
FASTING STATUS PATIENT QL REPORTED: YES
GFR SERPL CREATININE-BSD FRML MDRD: 85 ML/MIN/1.73M2
GLUCOSE BLD-MCNC: 96 MG/DL (ref 70–99)
HBA1C MFR BLD: 5.4 % (ref 0–5.6)
HCT VFR BLD AUTO: 44.1 % (ref 40–53)
HDLC SERPL-MCNC: 56 MG/DL
HGB BLD-MCNC: 14.8 G/DL (ref 13.3–17.7)
LDLC SERPL CALC-MCNC: 96 MG/DL
LYMPHOCYTES # BLD AUTO: 1.7 10E3/UL (ref 0.8–5.3)
LYMPHOCYTES NFR BLD AUTO: 25 %
MCH RBC QN AUTO: 30.6 PG (ref 26.5–33)
MCHC RBC AUTO-ENTMCNC: 33.6 G/DL (ref 31.5–36.5)
MCV RBC AUTO: 91 FL (ref 78–100)
MONOCYTES # BLD AUTO: 0.7 10E3/UL (ref 0–1.3)
MONOCYTES NFR BLD AUTO: 10 %
NEUTROPHILS # BLD AUTO: 4 10E3/UL (ref 1.6–8.3)
NEUTROPHILS NFR BLD AUTO: 60 %
NONHDLC SERPL-MCNC: 120 MG/DL
PLATELET # BLD AUTO: 241 10E3/UL (ref 150–450)
POTASSIUM BLD-SCNC: 4.5 MMOL/L (ref 3.4–5.3)
PROT SERPL-MCNC: 7 G/DL (ref 6.8–8.8)
PSA SERPL-MCNC: <0.01 UG/L (ref 0–4)
RBC # BLD AUTO: 4.83 10E6/UL (ref 4.4–5.9)
SODIUM SERPL-SCNC: 141 MMOL/L (ref 133–144)
TRIGL SERPL-MCNC: 119 MG/DL
TSH SERPL DL<=0.005 MIU/L-ACNC: 2.52 MU/L (ref 0.4–4)
WBC # BLD AUTO: 6.6 10E3/UL (ref 4–11)

## 2021-09-24 PROCEDURE — 99397 PER PM REEVAL EST PAT 65+ YR: CPT | Performed by: FAMILY MEDICINE

## 2021-09-24 PROCEDURE — 36415 COLL VENOUS BLD VENIPUNCTURE: CPT | Performed by: FAMILY MEDICINE

## 2021-09-24 PROCEDURE — 83036 HEMOGLOBIN GLYCOSYLATED A1C: CPT | Performed by: FAMILY MEDICINE

## 2021-09-24 PROCEDURE — G0103 PSA SCREENING: HCPCS | Performed by: FAMILY MEDICINE

## 2021-09-24 PROCEDURE — 80053 COMPREHEN METABOLIC PANEL: CPT | Performed by: FAMILY MEDICINE

## 2021-09-24 PROCEDURE — 85025 COMPLETE CBC W/AUTO DIFF WBC: CPT | Performed by: FAMILY MEDICINE

## 2021-09-24 PROCEDURE — 80061 LIPID PANEL: CPT | Performed by: FAMILY MEDICINE

## 2021-09-24 PROCEDURE — 82306 VITAMIN D 25 HYDROXY: CPT | Performed by: FAMILY MEDICINE

## 2021-09-24 PROCEDURE — 84443 ASSAY THYROID STIM HORMONE: CPT | Performed by: FAMILY MEDICINE

## 2021-09-24 RX ORDER — ATORVASTATIN CALCIUM 10 MG/1
10 TABLET, FILM COATED ORAL DAILY
Qty: 90 TABLET | Refills: 3 | Status: SHIPPED | OUTPATIENT
Start: 2021-09-24 | End: 2022-10-03

## 2021-09-24 RX ORDER — LISINOPRIL 20 MG/1
TABLET ORAL
Qty: 90 TABLET | Refills: 3 | Status: SHIPPED | OUTPATIENT
Start: 2021-09-24 | End: 2022-10-03

## 2021-09-24 RX ORDER — AMLODIPINE BESYLATE 5 MG/1
TABLET ORAL
Qty: 90 TABLET | Refills: 3 | Status: SHIPPED | OUTPATIENT
Start: 2021-09-24 | End: 2022-10-03

## 2021-09-24 ASSESSMENT — ENCOUNTER SYMPTOMS
HEMATOCHEZIA: 0
DYSURIA: 0
EYE PAIN: 0
ABDOMINAL PAIN: 0
SHORTNESS OF BREATH: 0
JOINT SWELLING: 0
FEVER: 0
DIARRHEA: 0
FREQUENCY: 0
WEAKNESS: 0
ARTHRALGIAS: 0
HEARTBURN: 0
NERVOUS/ANXIOUS: 0
COUGH: 0
CONSTIPATION: 0
CHILLS: 0
SORE THROAT: 0
MYALGIAS: 0
DIZZINESS: 0
PALPITATIONS: 0
HEMATURIA: 0
HEADACHES: 0
NAUSEA: 0
PARESTHESIAS: 0

## 2021-09-24 ASSESSMENT — PAIN SCALES - GENERAL: PAINLEVEL: NO PAIN (0)

## 2021-09-24 ASSESSMENT — ACTIVITIES OF DAILY LIVING (ADL): CURRENT_FUNCTION: NO ASSISTANCE NEEDED

## 2021-09-24 ASSESSMENT — MIFFLIN-ST. JEOR: SCORE: 1661.03

## 2021-09-24 NOTE — PATIENT INSTRUCTIONS
Call and schedule ultrasound to rule out AAA    Stay on same meds    Keep working on healthy diet/exercise and  Wt loss    We will send you lab results

## 2021-09-24 NOTE — PROGRESS NOTES
"SUBJECTIVE:   Brian Stephenson is a 71 year old male who presents for Preventive Visit.        Patient has been advised of split billing requirements and indicates understanding: Yes   Are you in the first 12 months of your Medicare coverage?  No    Healthy Habits:     In general, how would you rate your overall health?  Excellent    Frequency of exercise:  4-5 days/week    Duration of exercise:  15-30 minutes    Do you usually eat at least 4 servings of fruit and vegetables a day, include whole grains    & fiber and avoid regularly eating high fat or \"junk\" foods?  No    Taking medications regularly:  No    Medication side effects:  None    Ability to successfully perform activities of daily living:  No assistance needed    Home Safety:  No safety concerns identified    Hearing Impairment:  No hearing concerns    In the past 6 months, have you been bothered by leaking of urine?  No    In general, how would you rate your overall mental or emotional health?  Excellent      PHQ-2 Total Score: 0    Additional concerns today:  No    Do you feel safe in your environment? Yes    Have you ever done Advance Care Planning? (For example, a Health Directive, POLST, or a discussion with a medical provider or your loved ones about your wishes): Yes, advance care planning is on file.       Fall risk  Fallen 2 or more times in the past year?: No  Any fall with injury in the past year?: No    Cognitive Screening   1) Repeat 3 items (Leader, Season, Table)     2) Clock draw:  NORMAL  3) 3 item recall:   Recalls 2 objects   Results: NORMAL clock, 1-2 items recalled: COGNITIVE IMPAIRMENT LESS LIKELY    Mini-CogTM Copyright SANJAY Ayala. Licensed by the author for use in [facility]; reprinted with permission ([email or web address]). All rights reserved.      Do you have sleep apnea, excessive snoring or daytime drowsiness?: no    Reviewed and updated as needed this visit by clinical staff  Tobacco  Allergies    Med Hx  Surg Hx  Fam Hx "  Soc Hx        Reviewed and updated as needed this visit by Provider                Social History     Tobacco Use     Smoking status: Former Smoker     Packs/day: 0.00     Quit date: 1997     Years since quittin.7     Smokeless tobacco: Never Used     Tobacco comment: 30 pack year history    Substance Use Topics     Alcohol use: Yes     Comment: rare     If you drink alcohol do you typically have >3 drinks per day or >7 drinks per week? Yes       No flowsheet data found.             Current providers sharing in care for this patient include:     Patient Care Team:  Amilcar Gaona MD as PCP - General  Amilcar Gaona MD as Assigned PCP    The following health maintenance items are reviewed in Epic and correct as of today:  Health Maintenance Due   Topic Date Due     ANNUAL REVIEW OF HM ORDERS  Never done     AORTIC ANEURYSM SCREENING (SYSTEM ASSIGNED)  Never done               Review of Systems   Constitutional: Negative for chills and fever.   HENT: Negative for congestion, ear pain, hearing loss and sore throat.    Eyes: Negative for pain and visual disturbance.   Respiratory: Negative for cough and shortness of breath.    Cardiovascular: Negative for chest pain, palpitations and peripheral edema.   Gastrointestinal: Negative for abdominal pain, constipation, diarrhea, heartburn, hematochezia and nausea.   Genitourinary: Positive for impotence. Negative for discharge, dysuria, frequency, genital sores, hematuria and urgency.   Musculoskeletal: Negative for arthralgias, joint swelling and myalgias.   Skin: Negative for rash.   Neurological: Negative for dizziness, weakness, headaches and paresthesias.   Psychiatric/Behavioral: Negative for mood changes. The patient is not nervous/anxious.        Takes 2000 units vit d, wants it checked    Working on weight    Walking / treadmill        OBJECTIVE:   /76 (BP Location: Left arm, Patient Position: Chair, Cuff Size: Adult Regular)   Pulse (!) 42    "Temp 97.1  F (36.2  C) (Temporal)   Ht 1.74 m (5' 8.5\")   Wt 91.9 kg (202 lb 8 oz)   BMI 30.34 kg/m   Estimated body mass index is 30.34 kg/m  as calculated from the following:    Height as of this encounter: 1.74 m (5' 8.5\").    Weight as of this encounter: 91.9 kg (202 lb 8 oz).  Physical Exam  GENERAL: healthy, alert and no distress  EYES: Eyes grossly normal to inspection, PERRL and conjunctivae and sclerae normal  HENT: ear canals and TM's normal, nose and mouth without ulcers or lesions  NECK: no adenopathy, no asymmetry, masses, or scars and thyroid normal to palpation  RESP: lungs clear to auscultation - no rales, rhonchi or wheezes  CV: regular rate and rhythm, normal S1 S2, no S3 or S4, no murmur, click or rub, no peripheral edema and peripheral pulses strong  ABDOMEN: soft, nontender, no hepatosplenomegaly, no masses and bowel sounds normal  MS: no gross musculoskeletal defects noted, no edema  SKIN: no suspicious lesions or rashes  NEURO: Normal strength and tone, mentation intact and speech normal  PSYCH: mentation appears normal, affect normal/bright    Diagnostic Test Results:  Labs reviewed in Epic    ASSESSMENT / PLAN:   Brian was seen today for wellness visit and health maintenance.    Diagnoses and all orders for this visit:    Routine general medical examination at a health care facility    Male erectile disorder (CODE)   -     US Abdominal Aorta Imaging; Future    Hyperlipidemia LDL goal <130  -     Lipid panel reflex to direct LDL Fasting; Future  -     atorvastatin (LIPITOR) 10 MG tablet; Take 1 tablet (10 mg) by mouth daily    Screening for prostate cancer  -     Prostate Specific Antigen Screen; Future    Fatigue, unspecified type  -     Comprehensive metabolic panel; Future  -     TSH with free T4 reflex; Future  -     CBC with Platelets & Differential; Future    Vitamin D deficiency disease  -     Vitamin D Deficiency; Future    Impaired fasting glucose  -     Hemoglobin A1c; " "Future    Essential hypertension with goal blood pressure less than 140/90  -     amLODIPine (NORVASC) 5 MG tablet; TAKE 1 TABLET(5 MG) BY MOUTH DAILY  -     lisinopril (ZESTRIL) 20 MG tablet; TAKE 1 TABLET(20 MG) BY MOUTH DAILY    Other orders  -     REVIEW OF HEALTH MAINTENANCE PROTOCOL ORDERS    Discussed multiple issues with patient  Refill meds  Check labs  About 4 years since prostatectomy  Screen for AAA ( patient's dad  of this in his mid 70s )  Up to date on colonoscopy     Patient has been advised of split billing requirements and indicates understanding: Yes  COUNSELING:  Reviewed preventive health counseling, as reflected in patient instructions       Regular exercise       Healthy diet/nutrition       Vision screening       Dental care       Colon cancer screening       Prostate cancer screening    Estimated body mass index is 30.34 kg/m  as calculated from the following:    Height as of this encounter: 1.74 m (5' 8.5\").    Weight as of this encounter: 91.9 kg (202 lb 8 oz).    Weight management plan: Discussed healthy diet and exercise guidelines    He reports that he quit smoking about 24 years ago. He smoked 0.00 packs per day. He has never used smokeless tobacco.      Appropriate preventive services were discussed with this patient, including applicable screening as appropriate for cardiovascular disease, diabetes, osteopenia/osteoporosis, and glaucoma.  As appropriate for age/gender, discussed screening for colorectal cancer, prostate cancer, breast cancer, and cervical cancer. Checklist reviewing preventive services available has been given to the patient.    Reviewed patients plan of care and provided an AVS. The Basic Care Plan (routine screening as documented in Health Maintenance) for Brian meets the Care Plan requirement. This Care Plan has been established and reviewed with the Patient.    Counseling Resources:  ATP IV Guidelines  Pooled Cohorts Equation Calculator  Breast Cancer Risk " Calculator  Breast Cancer: Medication to Reduce Risk  FRAX Risk Assessment  ICSI Preventive Guidelines  Dietary Guidelines for Americans, 2010  [employer] MyPlate  ASA Prophylaxis  Lung CA Screening    Amilcar Gaona MD  [facility]    Identified Health Risks:

## 2021-09-27 LAB — DEPRECATED CALCIDIOL+CALCIFEROL SERPL-MC: 66 UG/L (ref 20–75)

## 2021-10-01 ENCOUNTER — ANCILLARY PROCEDURE (OUTPATIENT)
Dept: ULTRASOUND IMAGING | Facility: CLINIC | Age: 71
End: 2021-10-01
Attending: FAMILY MEDICINE
Payer: COMMERCIAL

## 2021-10-01 DIAGNOSIS — F52.21 MALE ERECTILE DISORDER (CODE): ICD-10-CM

## 2021-10-01 PROCEDURE — 76775 US EXAM ABDO BACK WALL LIM: CPT | Performed by: RADIOLOGY

## 2022-05-24 ENCOUNTER — VIRTUAL VISIT (OUTPATIENT)
Dept: URGENT CARE | Facility: CLINIC | Age: 72
End: 2022-05-24
Payer: COMMERCIAL

## 2022-05-24 DIAGNOSIS — U07.1 INFECTION DUE TO 2019 NOVEL CORONAVIRUS: Primary | ICD-10-CM

## 2022-05-24 PROCEDURE — 99443 PR PHYSICIAN TELEPHONE EVALUATION 21-30 MIN: CPT | Mod: 95

## 2022-05-24 ASSESSMENT — ENCOUNTER SYMPTOMS
SHORTNESS OF BREATH: 0
COUGH: 1
DIARRHEA: 0
RHINORRHEA: 1
WHEEZING: 0
VOMITING: 0
FEVER: 1
SORE THROAT: 1

## 2022-05-24 NOTE — PROGRESS NOTES
Telemedicine Visit: The patient's condition can be safely assessed and treated via synchronous audio and visual telemedicine encounter.  Unable to do video.     Reason for Telemedicine Visit: Patient has requested telehealth visit    Originating Site (Patient Location): Patient's home    Distant Site (Provider Location): [facility]: [place]    Consent:  The patient/guardian has verbally consented to: the potential risks and benefits of telemedicine (video visit) versus in person care; bill my insurance or make self-payment for services provided; and responsibility for payment of non-covered services.     Mode of Communication:  Phone visit  Start time: 5:30pm  End time: 5:52pm    As the provider I attest to compliance with applicable laws and regulations related to telemedicine.  Assessment & Plan:        ICD-10-CM    1. Infection due to 2019 novel coronavirus  U07.1 nirmatrelvir and ritonavir (PAXLOVID) therapy pack         Plan/Clinical Decision Making:    Discussed medication interactions.   Quit lipitor during course of Paxlovid.   No changes to amlodipine, is on 5mg and discussed the side effects of amlodipine- hyptension, edema and dose reduction of causing side effects.     Return if symptoms worsen or fail to improve.     At the end of the encounter, I discussed results, diagnosis, medications. Discussed red flags for immediate return to clinic/ER, as well as indications for follow up if no improvement. Patient understood and agreed to plan. Patient was stable for discharge.        Shahida Campbell PA-C on 5/24/2022 at 5:30 PM          Subjective:     HPI:    Brian is a 72 year old male who presents to clinic today for the following health issues:  Chief Complaint   Patient presents with     Infection     HPI    On late Sunday/early Sunday noticed a slight cough.   Patient was coughing up mucous.   On Monday noticing symptoms were getting worse and started having body aches.   Temp at about 100.  99.9 today.   Patient took at home test and came back positive. Took another one and was also positive. Took test yesterday.     History obtained from the patient.    Review of Systems   Constitutional: Positive for fever.   HENT: Positive for congestion, rhinorrhea and sore throat.    Respiratory: Positive for cough. Negative for shortness of breath and wheezing.    Cardiovascular: Negative for chest pain.   Gastrointestinal: Negative for diarrhea and vomiting.         Patient Active Problem List   Diagnosis     Advanced directives, counseling/discussion     Hyperlipidemia LDL goal <130     Hypertension goal BP (blood pressure) < 140/90     Overweight     Prostate cancer (H)     History of prostatectomy        Past Medical History:   Diagnosis Date     Hyperlipidemia        Social History     Tobacco Use     Smoking status: Former Smoker     Packs/day: 0.00     Quit date: 1997     Years since quittin.4     Smokeless tobacco: Never Used     Tobacco comment: 30 pack year history    Substance Use Topics     Alcohol use: Yes     Comment: rare             Objective:     There were no vitals filed for this visit.      Physical Exam   Pleasant, alert,   Normal breathing on phone.       Results:  No results found for any visits on 22.

## 2022-09-15 ENCOUNTER — IMMUNIZATION (OUTPATIENT)
Dept: FAMILY MEDICINE | Facility: CLINIC | Age: 72
End: 2022-09-15
Payer: COMMERCIAL

## 2022-09-15 DIAGNOSIS — Z23 HIGH PRIORITY FOR 2019-NCOV VACCINE: Primary | ICD-10-CM

## 2022-09-15 PROCEDURE — 91312 COVID-19,PF,PFIZER BOOSTER BIVALENT: CPT

## 2022-09-15 PROCEDURE — 0124A COVID-19,PF,PFIZER BOOSTER BIVALENT: CPT

## 2022-09-15 PROCEDURE — 99207 PR NO CHARGE NURSE ONLY: CPT

## 2022-10-03 ENCOUNTER — OFFICE VISIT (OUTPATIENT)
Dept: FAMILY MEDICINE | Facility: CLINIC | Age: 72
End: 2022-10-03
Payer: COMMERCIAL

## 2022-10-03 VITALS
WEIGHT: 200.38 LBS | DIASTOLIC BLOOD PRESSURE: 74 MMHG | OXYGEN SATURATION: 96 % | TEMPERATURE: 97 F | HEIGHT: 69 IN | SYSTOLIC BLOOD PRESSURE: 136 MMHG | HEART RATE: 50 BPM | BODY MASS INDEX: 29.68 KG/M2

## 2022-10-03 DIAGNOSIS — Z12.11 SCREEN FOR COLON CANCER: ICD-10-CM

## 2022-10-03 DIAGNOSIS — I10 ESSENTIAL HYPERTENSION WITH GOAL BLOOD PRESSURE LESS THAN 140/90: ICD-10-CM

## 2022-10-03 DIAGNOSIS — R73.01 IMPAIRED FASTING GLUCOSE: ICD-10-CM

## 2022-10-03 DIAGNOSIS — R53.83 FATIGUE, UNSPECIFIED TYPE: ICD-10-CM

## 2022-10-03 DIAGNOSIS — Z00.00 ENCOUNTER FOR MEDICARE ANNUAL WELLNESS EXAM: Primary | ICD-10-CM

## 2022-10-03 DIAGNOSIS — Z85.46 HISTORY OF PROSTATE CANCER: ICD-10-CM

## 2022-10-03 DIAGNOSIS — Z00.00 ROUTINE GENERAL MEDICAL EXAMINATION AT A HEALTH CARE FACILITY: ICD-10-CM

## 2022-10-03 DIAGNOSIS — E55.9 VITAMIN D DEFICIENCY DISEASE: ICD-10-CM

## 2022-10-03 DIAGNOSIS — E78.5 HYPERLIPIDEMIA LDL GOAL <130: ICD-10-CM

## 2022-10-03 DIAGNOSIS — Z12.5 SCREENING FOR PROSTATE CANCER: ICD-10-CM

## 2022-10-03 LAB
BASOPHILS # BLD AUTO: 0.1 10E3/UL (ref 0–0.2)
BASOPHILS NFR BLD AUTO: 1 %
EOSINOPHIL # BLD AUTO: 0.2 10E3/UL (ref 0–0.7)
EOSINOPHIL NFR BLD AUTO: 3 %
ERYTHROCYTE [DISTWIDTH] IN BLOOD BY AUTOMATED COUNT: 14.2 % (ref 10–15)
HBA1C MFR BLD: 5.4 % (ref 0–5.6)
HCT VFR BLD AUTO: 44.8 % (ref 40–53)
HGB BLD-MCNC: 15.1 G/DL (ref 13.3–17.7)
LYMPHOCYTES # BLD AUTO: 1.5 10E3/UL (ref 0.8–5.3)
LYMPHOCYTES NFR BLD AUTO: 20 %
MCH RBC QN AUTO: 31.2 PG (ref 26.5–33)
MCHC RBC AUTO-ENTMCNC: 33.7 G/DL (ref 31.5–36.5)
MCV RBC AUTO: 93 FL (ref 78–100)
MONOCYTES # BLD AUTO: 0.6 10E3/UL (ref 0–1.3)
MONOCYTES NFR BLD AUTO: 8 %
NEUTROPHILS # BLD AUTO: 4.8 10E3/UL (ref 1.6–8.3)
NEUTROPHILS NFR BLD AUTO: 67 %
PLATELET # BLD AUTO: 248 10E3/UL (ref 150–450)
RBC # BLD AUTO: 4.84 10E6/UL (ref 4.4–5.9)
WBC # BLD AUTO: 7.2 10E3/UL (ref 4–11)

## 2022-10-03 PROCEDURE — 80053 COMPREHEN METABOLIC PANEL: CPT | Performed by: FAMILY MEDICINE

## 2022-10-03 PROCEDURE — 36415 COLL VENOUS BLD VENIPUNCTURE: CPT | Performed by: FAMILY MEDICINE

## 2022-10-03 PROCEDURE — 82306 VITAMIN D 25 HYDROXY: CPT | Performed by: FAMILY MEDICINE

## 2022-10-03 PROCEDURE — 80061 LIPID PANEL: CPT | Performed by: FAMILY MEDICINE

## 2022-10-03 PROCEDURE — G0438 PPPS, INITIAL VISIT: HCPCS | Performed by: FAMILY MEDICINE

## 2022-10-03 PROCEDURE — 84443 ASSAY THYROID STIM HORMONE: CPT | Performed by: FAMILY MEDICINE

## 2022-10-03 PROCEDURE — 85025 COMPLETE CBC W/AUTO DIFF WBC: CPT | Performed by: FAMILY MEDICINE

## 2022-10-03 PROCEDURE — 84153 ASSAY OF PSA TOTAL: CPT | Performed by: FAMILY MEDICINE

## 2022-10-03 PROCEDURE — 83036 HEMOGLOBIN GLYCOSYLATED A1C: CPT | Performed by: FAMILY MEDICINE

## 2022-10-03 RX ORDER — AMLODIPINE BESYLATE 5 MG/1
TABLET ORAL
Qty: 90 TABLET | Refills: 3 | Status: SHIPPED | OUTPATIENT
Start: 2022-10-03 | End: 2023-09-20

## 2022-10-03 RX ORDER — CHOLECALCIFEROL (VITAMIN D3) 50 MCG
1 TABLET ORAL DAILY
COMMUNITY
Start: 2022-10-03

## 2022-10-03 RX ORDER — ATORVASTATIN CALCIUM 10 MG/1
10 TABLET, FILM COATED ORAL DAILY
Qty: 90 TABLET | Refills: 3 | Status: SHIPPED | OUTPATIENT
Start: 2022-10-03 | End: 2023-09-20

## 2022-10-03 RX ORDER — LISINOPRIL 20 MG/1
TABLET ORAL
Qty: 90 TABLET | Refills: 3 | Status: SHIPPED | OUTPATIENT
Start: 2022-10-03 | End: 2023-09-20

## 2022-10-03 ASSESSMENT — ENCOUNTER SYMPTOMS
DYSURIA: 0
DIARRHEA: 0
NERVOUS/ANXIOUS: 0
HEMATOCHEZIA: 0
SORE THROAT: 0
FEVER: 0
PALPITATIONS: 0
NAUSEA: 0
ABDOMINAL PAIN: 0
MYALGIAS: 0
HEARTBURN: 0
ARTHRALGIAS: 0
EYE PAIN: 0
COUGH: 0
HEADACHES: 0
CONSTIPATION: 0
FREQUENCY: 0
WEAKNESS: 0
PARESTHESIAS: 0
JOINT SWELLING: 0
DIZZINESS: 0
SHORTNESS OF BREATH: 0
CHILLS: 0
HEMATURIA: 0

## 2022-10-03 ASSESSMENT — ACTIVITIES OF DAILY LIVING (ADL): CURRENT_FUNCTION: NO ASSISTANCE NEEDED

## 2022-10-03 ASSESSMENT — PAIN SCALES - GENERAL: PAINLEVEL: NO PAIN (0)

## 2022-10-03 NOTE — PROGRESS NOTES
"SUBJECTIVE:   Brian is a 72 year old who presents for Preventive Visit.        Patient has been advised of split billing requirements and indicates understanding: Yes  Are you in the first 12 months of your Medicare coverage?  No    Healthy Habits:     In general, how would you rate your overall health?  Excellent    Frequency of exercise:  4-5 days/week    Duration of exercise:  15-30 minutes    Do you usually eat at least 4 servings of fruit and vegetables a day, include whole grains    & fiber and avoid regularly eating high fat or \"junk\" foods?  No    Taking medications regularly:  Yes    Medication side effects:  None    Ability to successfully perform activities of daily living:  No assistance needed    Home Safety:  No safety concerns identified    Hearing Impairment:  No hearing concerns    In the past 6 months, have you been bothered by leaking of urine?  No    In general, how would you rate your overall mental or emotional health?  Excellent      PHQ-2 Total Score: 0    Additional concerns today:  No    Do you feel safe in your environment? Yes    Have you ever done Advance Care Planning? (For example, a Health Directive, POLST, or a discussion with a medical provider or your loved ones about your wishes): Yes, advance care planning is on file.       Fall risk  Fallen 2 or more times in the past year?: No  Any fall with injury in the past year?: No    Cognitive Screening   1) Repeat 3 items (Leader, Season, Table)     2) Clock draw:    NORMAL  3) 3 item recall:   Recalls 3 objects  Results: NORMAL clock, 1-2 items recalled: COGNITIVE IMPAIRMENT LESS LIKELY    Mini-CogTM Copyright SANJAY Ayala. Licensed by the author for use in [facility]; reprinted with permission ([email or web address]). All rights reserved.      Do you have sleep apnea, excessive snoring or daytime drowsiness?: no    Reviewed and updated as needed this visit by clinical staff   Tobacco   Meds   Med Hx  Surg Hx  Fam Hx  Soc Hx      "     Reviewed and updated as needed this visit by Provider                   Social History     Tobacco Use     Smoking status: Former Smoker     Packs/day: 0.00     Quit date: 1997     Years since quittin.7     Smokeless tobacco: Never Used     Tobacco comment: 30 pack year history    Substance Use Topics     Alcohol use: Yes     Comment: rare     If you drink alcohol do you typically have >3 drinks per day or >7 drinks per week? Yes      Alcohol Use 10/3/2022   Prescreen: >3 drinks/day or >7 drinks/week? No   Prescreen: >3 drinks/day or >7 drinks/week? -   No flowsheet data found.              Current providers sharing in care for this patient include:    Patient Care Team:  Amilcar Gaona MD as PCP - General  Amilcar Gaona MD as Assigned PCP    The following health maintenance items are reviewed in Epic and correct as of today:  Health Maintenance   Topic Date Due     INFLUENZA VACCINE (1) 2022     ANNUAL REVIEW OF HM ORDERS  2022     COLORECTAL CANCER SCREENING  2023     MEDICARE ANNUAL WELLNESS VISIT  10/03/2023     FALL RISK ASSESSMENT  10/03/2023     LIPID  2026     ADVANCE CARE PLANNING  10/03/2027     DTAP/TDAP/TD IMMUNIZATION (3 - Td or Tdap) 2028     HEPATITIS C SCREENING  Completed     PHQ-2 (once per calendar year)  Completed     Pneumococcal Vaccine: 65+ Years  Completed     ZOSTER IMMUNIZATION  Completed     AORTIC ANEURYSM SCREENING (SYSTEM ASSIGNED)  Completed     COVID-19 Vaccine  Completed     IPV IMMUNIZATION  Aged Out     MENINGITIS IMMUNIZATION  Aged Out     HEPATITIS B IMMUNIZATION  Aged Out          Review of Systems   Constitutional: Negative for chills and fever.   HENT: Negative for congestion, ear pain, hearing loss and sore throat.    Eyes: Negative for pain and visual disturbance.   Respiratory: Negative for cough and shortness of breath.    Cardiovascular: Negative for chest pain, palpitations and peripheral edema.   Gastrointestinal:  "Negative for abdominal pain, constipation, diarrhea, heartburn, hematochezia and nausea.   Genitourinary: Positive for impotence. Negative for dysuria, frequency, genital sores, hematuria, penile discharge and urgency.   Musculoskeletal: Negative for arthralgias, joint swelling and myalgias.   Skin: Negative for rash.   Neurological: Negative for dizziness, weakness, headaches and paresthesias.   Psychiatric/Behavioral: Negative for mood changes. The patient is not nervous/anxious.         Wants vit d and psa checked    2000 units vit d daily     Not much sun exposure    5 years since prostatectomy    ED since then     No change      No meds etc needed for this    4-5 x per week     Walk a mile        OBJECTIVE:   BP (!) 144/80 (BP Location: Right arm, Patient Position: Chair, Cuff Size: Adult Regular)   Pulse 50   Temp 97  F (36.1  C) (Temporal)   Ht 1.74 m (5' 8.5\")   Wt 90.9 kg (200 lb 6 oz)   SpO2 96%   BMI 30.02 kg/m   Estimated body mass index is 30.02 kg/m  as calculated from the following:    Height as of this encounter: 1.74 m (5' 8.5\").    Weight as of this encounter: 90.9 kg (200 lb 6 oz).  Physical Exam  GENERAL: healthy, alert and no distress  EYES: Eyes grossly normal to inspection, PERRL and conjunctivae and sclerae normal  HENT: ear canals and TM's normal, nose and mouth without ulcers or lesions; some wax left ear canal, so tympanic membrane only partially seen  NECK: no adenopathy, no asymmetry, masses, or scars and thyroid normal to palpation  RESP: lungs clear to auscultation - no rales, rhonchi or wheezes  CV: regular rate and rhythm, normal S1 S2, no S3 or S4, no murmur, click or rub, no peripheral edema and peripheral pulses strong  ABDOMEN: soft, nontender, no hepatosplenomegaly, no masses and bowel sounds normal  MS: no gross musculoskeletal defects noted, no edema  SKIN: no suspicious lesions or rashes  NEURO: Normal strength and tone, mentation intact and speech normal  PSYCH: " "mentation appears normal, affect normal/bright    Diagnostic Test Results:  Labs reviewed in Epic    ASSESSMENT / PLAN:   Brian was seen today for wellness visit.    Diagnoses and all orders for this visit:    Encounter for Medicare annual wellness exam    Routine general medical examination at a health care facility    Essential hypertension with goal blood pressure less than 140/90  -     amLODIPine (NORVASC) 5 MG tablet; TAKE 1 TABLET(5 MG) BY MOUTH DAILY  -     lisinopril (ZESTRIL) 20 MG tablet; TAKE 1 TABLET(20 MG) BY MOUTH DAILY    Hyperlipidemia LDL goal <130  -     atorvastatin (LIPITOR) 10 MG tablet; Take 1 tablet (10 mg) by mouth daily  -     Lipid panel reflex to direct LDL Fasting; Future  -     Comprehensive metabolic panel; Future  -     Lipid panel reflex to direct LDL Fasting  -     Comprehensive metabolic panel    Fatigue, unspecified type  -     CBC with Platelets & Differential; Future  -     TSH with free T4 reflex; Future  -     CBC with Platelets & Differential  -     TSH with free T4 reflex    Impaired fasting glucose  -     Hemoglobin A1c; Future  -     Hemoglobin A1c    Screening for prostate cancer  -     Prostate Specific Antigen Screen; Future  -     Prostate Specific Antigen Screen    Screen for colon cancer  -     Adult GI  Referral - Procedure Only; Future    History of prostate cancer    Overall patient stable  Check labs   Refill meds  Keep working on healthy diet/exercise and wt loss  Colonoscopy will be due next summer     Patient has been advised of split billing requirements and indicates understanding: Yes    COUNSELING:  Reviewed preventive health counseling, as reflected in patient instructions       Regular exercise       Healthy diet/nutrition       Vision screening       Dental care    Estimated body mass index is 30.02 kg/m  as calculated from the following:    Height as of this encounter: 1.74 m (5' 8.5\").    Weight as of this encounter: 90.9 kg (200 lb 6 " oz).    Weight management plan: Discussed healthy diet and exercise guidelines    He reports that he quit smoking about 25 years ago. He smoked 0.00 packs per day. He has never used smokeless tobacco.      Appropriate preventive services were discussed with this patient, including applicable screening as appropriate for cardiovascular disease, diabetes, osteopenia/osteoporosis, and glaucoma.  As appropriate for age/gender, discussed screening for colorectal cancer, prostate cancer, breast cancer, and cervical cancer. Checklist reviewing preventive services available has been given to the patient.    Reviewed patients plan of care and provided an AVS. The Basic Care Plan (routine screening as documented in Health Maintenance) for Brian meets the Care Plan requirement. This Care Plan has been established and reviewed with the Patient.    Counseling Resources:  ATP IV Guidelines  Pooled Cohorts Equation Calculator  Breast Cancer Risk Calculator  Breast Cancer: Medication to Reduce Risk  FRAX Risk Assessment  ICSI Preventive Guidelines  Dietary Guidelines for Americans, 2010  USDA's MyPlate  ASA Prophylaxis  Lung CA Screening    Amilcar Gaona MD  [facility]    Identified Health Risks:    The patient was counseled and encouraged to consider modifying their diet and eating habits. He was provided with information on recommended healthy diet options.

## 2022-10-03 NOTE — PATIENT INSTRUCTIONS
Keep working on healthy diet/exercise and weight loss    We will send you lab results    Stay on same meds       Patient Education   Personalized Prevention Plan  You are due for the preventive services outlined below.  Your care team is available to assist you in scheduling these services.  If you have already completed any of these items, please share that information with your care team to update in your medical record.  Health Maintenance Due   Topic Date Due    Flu Vaccine (1) 09/01/2022    ANNUAL REVIEW OF HM ORDERS  09/24/2022       Understanding USDA MyPlate  The USDA has guidelines to help you make healthy food choices. These are called MyPlate. MyPlate shows the food groups that make up healthy meals using the image of a place setting. Before you eat, think about the healthiest choices for what to put on your plate or in your cup or bowl. To learn more about building a healthy plate, visit www.choosemyplate.gov.    The food groups  Fruits. Any fruit or 100% fruit juice counts as part of the Fruit Group. Fruits may be fresh, canned, frozen, or dried, and may be whole, cut-up, or pureed. Make 1/2 of your plate fruits and vegetables.  Vegetables. Any vegetable or 100% vegetable juice counts as a member of the Vegetable Group. Vegetables may be fresh, frozen, canned, or dried. They can be served raw or cooked and may be whole, cut-up, or mashed. Make 1/2 of your plate fruits and vegetables.  Grains. All foods made from grains are part of the Grains Group. These include wheat, rice, oats, cornmeal, and barley. Grains are often used to make foods such as bread, pasta, oatmeal, cereal, tortillas, and grits. Grains should be no more than 1/4 of your plate. At least half of your grains should be whole grains.  Protein. This group includes meat, poultry, seafood, beans and peas, eggs, processed soy products (such as tofu), nuts (including nut butters), and seeds. Make protein choices no more than 1/4  of your plate. Meat and poultry choices should be lean or low fat.  Dairy. The Dairy Group includes all fluid milk products and foods made from milk that contain calcium, such as yogurt and cheese. (Foods that have little calcium, such as cream, butter, and cream cheese, are not part of this group.) Most dairy choices should be low-fat or fat-free.  Oils. Oils aren't a food group, but they do contain essential nutrients. However it's important to watch your intake of oils. These are fats that are liquid at room temperature. They include canola, corn, olive, soybean, vegetable, and sunflower oil. Foods that are mainly oil include mayonnaise, certain salad dressings, and soft margarines. You likely already get your daily oil allowance from the foods you eat.  Things to limit  Eating healthy also means limiting these things in your diet:     Salt (sodium). Many processed foods have a lot of sodium. To keep sodium intake down, eat fresh vegetables, meats, poultry, and seafood when possible. Purchase low-sodium, reduced-sodium, or no-salt-added food products at the store. And don't add salt to your meals at home. Instead, season them with herbs and spices such as dill, oregano, cumin, and paprika. Or try adding flavor with lemon or lime zest and juice.  Saturated fat. Saturated fats are most often found in animal products such as beef, pork, and chicken. They are often solid at room temperature, such as butter. To reduce your saturated fat intake, choose leaner cuts of meat and poultry. And try healthier cooking methods such as grilling, broiling, roasting, or baking. For a simple lower-fat swap, use plain nonfat yogurt instead of mayonnaise when making potato salad or macaroni salad.  Added sugars. These are sugars added to foods. They are in foods such as ice cream, candy, soda, fruit drinks, sports drinks, energy drinks, cookies, pastries, jams, and syrups. Cut down on added sugars by sharing sweet treats with a  family member or friend. You can also choose fruit for dessert, and drink water or other unsweetened beverages.     [employer] last reviewed this educational content on 6/1/2020 2000-2021 [employer]. All rights reserved. This information is not intended as a substitute for professional medical care. Always follow your healthcare professional's instructions.

## 2022-10-04 LAB
ALBUMIN SERPL-MCNC: 3.7 G/DL (ref 3.4–5)
ALP SERPL-CCNC: 87 U/L (ref 40–150)
ALT SERPL W P-5'-P-CCNC: 23 U/L (ref 0–70)
ANION GAP SERPL CALCULATED.3IONS-SCNC: 1 MMOL/L (ref 3–14)
AST SERPL W P-5'-P-CCNC: 16 U/L (ref 0–45)
BILIRUB SERPL-MCNC: 1 MG/DL (ref 0.2–1.3)
BUN SERPL-MCNC: 12 MG/DL (ref 7–30)
CALCIUM SERPL-MCNC: 8.8 MG/DL (ref 8.5–10.1)
CHLORIDE BLD-SCNC: 112 MMOL/L (ref 94–109)
CHOLEST SERPL-MCNC: 165 MG/DL
CO2 SERPL-SCNC: 28 MMOL/L (ref 20–32)
CREAT SERPL-MCNC: 0.72 MG/DL (ref 0.66–1.25)
FASTING STATUS PATIENT QL REPORTED: NORMAL
GFR SERPL CREATININE-BSD FRML MDRD: >90 ML/MIN/1.73M2
GLUCOSE BLD-MCNC: 109 MG/DL (ref 70–99)
HDLC SERPL-MCNC: 61 MG/DL
LDLC SERPL CALC-MCNC: 75 MG/DL
NONHDLC SERPL-MCNC: 104 MG/DL
POTASSIUM BLD-SCNC: 4.3 MMOL/L (ref 3.4–5.3)
PROT SERPL-MCNC: 6.7 G/DL (ref 6.8–8.8)
PSA SERPL-MCNC: <0.01 UG/L (ref 0–4)
SODIUM SERPL-SCNC: 141 MMOL/L (ref 133–144)
TRIGL SERPL-MCNC: 145 MG/DL
TSH SERPL DL<=0.005 MIU/L-ACNC: 2 MU/L (ref 0.4–4)

## 2022-10-05 DIAGNOSIS — E55.9 VITAMIN D DEFICIENCY DISEASE: Primary | ICD-10-CM

## 2022-10-05 LAB — DEPRECATED CALCIDIOL+CALCIFEROL SERPL-MC: 57 UG/L (ref 20–75)

## 2022-10-05 NOTE — RESULT ENCOUNTER NOTE
Prostate blood test still zero.    Vitamin D is pending.    Other labs are okay.    Amilcar Gaona MD

## 2023-08-08 ENCOUNTER — TRANSFERRED RECORDS (OUTPATIENT)
Dept: HEALTH INFORMATION MANAGEMENT | Facility: CLINIC | Age: 73
End: 2023-08-08
Payer: COMMERCIAL

## 2023-09-14 ENCOUNTER — TRANSFERRED RECORDS (OUTPATIENT)
Dept: HEALTH INFORMATION MANAGEMENT | Facility: CLINIC | Age: 73
End: 2023-09-14
Payer: COMMERCIAL

## 2023-09-17 ASSESSMENT — ENCOUNTER SYMPTOMS
EYE PAIN: 0
ABDOMINAL PAIN: 0
PARESTHESIAS: 0
CONSTIPATION: 0
NERVOUS/ANXIOUS: 0
MYALGIAS: 0
FEVER: 0
SHORTNESS OF BREATH: 0
CHILLS: 0
DIARRHEA: 0
WEAKNESS: 0
HEMATURIA: 0
DYSURIA: 0
HEADACHES: 0
FREQUENCY: 0
SORE THROAT: 0
NAUSEA: 0
HEMATOCHEZIA: 0
COUGH: 0
JOINT SWELLING: 0
ARTHRALGIAS: 0
PALPITATIONS: 0
HEARTBURN: 0
DIZZINESS: 0

## 2023-09-17 ASSESSMENT — ACTIVITIES OF DAILY LIVING (ADL): CURRENT_FUNCTION: NO ASSISTANCE NEEDED

## 2023-09-20 ENCOUNTER — OFFICE VISIT (OUTPATIENT)
Dept: FAMILY MEDICINE | Facility: CLINIC | Age: 73
End: 2023-09-20
Payer: COMMERCIAL

## 2023-09-20 VITALS
RESPIRATION RATE: 16 BRPM | HEART RATE: 53 BPM | WEIGHT: 201.2 LBS | SYSTOLIC BLOOD PRESSURE: 133 MMHG | TEMPERATURE: 97.6 F | DIASTOLIC BLOOD PRESSURE: 82 MMHG | OXYGEN SATURATION: 96 % | HEIGHT: 69 IN | BODY MASS INDEX: 29.8 KG/M2

## 2023-09-20 DIAGNOSIS — Z12.5 SCREENING FOR PROSTATE CANCER: ICD-10-CM

## 2023-09-20 DIAGNOSIS — H35.30 MACULAR DEGENERATION (SENILE) OF RETINA: ICD-10-CM

## 2023-09-20 DIAGNOSIS — R73.01 IMPAIRED FASTING GLUCOSE: ICD-10-CM

## 2023-09-20 DIAGNOSIS — R53.83 FATIGUE, UNSPECIFIED TYPE: ICD-10-CM

## 2023-09-20 DIAGNOSIS — E78.5 HYPERLIPIDEMIA LDL GOAL <130: ICD-10-CM

## 2023-09-20 DIAGNOSIS — Z00.00 ENCOUNTER FOR MEDICARE ANNUAL WELLNESS EXAM: Primary | ICD-10-CM

## 2023-09-20 DIAGNOSIS — E55.9 VITAMIN D DEFICIENCY DISEASE: ICD-10-CM

## 2023-09-20 DIAGNOSIS — Z00.00 ROUTINE GENERAL MEDICAL EXAMINATION AT A HEALTH CARE FACILITY: ICD-10-CM

## 2023-09-20 DIAGNOSIS — I10 ESSENTIAL HYPERTENSION WITH GOAL BLOOD PRESSURE LESS THAN 140/90: ICD-10-CM

## 2023-09-20 LAB
ALBUMIN SERPL BCG-MCNC: 4.3 G/DL (ref 3.5–5.2)
ALP SERPL-CCNC: 90 U/L (ref 40–129)
ALT SERPL W P-5'-P-CCNC: 22 U/L (ref 0–70)
ANION GAP SERPL CALCULATED.3IONS-SCNC: 11 MMOL/L (ref 7–15)
AST SERPL W P-5'-P-CCNC: 24 U/L (ref 0–45)
BASOPHILS # BLD AUTO: 0.1 10E3/UL (ref 0–0.2)
BASOPHILS NFR BLD AUTO: 1 %
BILIRUB SERPL-MCNC: 0.9 MG/DL
BUN SERPL-MCNC: 7 MG/DL (ref 8–23)
CALCIUM SERPL-MCNC: 9.2 MG/DL (ref 8.8–10.2)
CHLORIDE SERPL-SCNC: 107 MMOL/L (ref 98–107)
CHOLEST SERPL-MCNC: 169 MG/DL
CREAT SERPL-MCNC: 0.87 MG/DL (ref 0.67–1.17)
DEPRECATED HCO3 PLAS-SCNC: 26 MMOL/L (ref 22–29)
EGFRCR SERPLBLD CKD-EPI 2021: >90 ML/MIN/1.73M2
EOSINOPHIL # BLD AUTO: 0.2 10E3/UL (ref 0–0.7)
EOSINOPHIL NFR BLD AUTO: 3 %
ERYTHROCYTE [DISTWIDTH] IN BLOOD BY AUTOMATED COUNT: 13.3 % (ref 10–15)
GLUCOSE SERPL-MCNC: 99 MG/DL (ref 70–99)
HBA1C MFR BLD: 5.6 % (ref 0–5.6)
HCT VFR BLD AUTO: 44.3 % (ref 40–53)
HDLC SERPL-MCNC: 54 MG/DL
HGB BLD-MCNC: 14.7 G/DL (ref 13.3–17.7)
IMM GRANULOCYTES # BLD: 0 10E3/UL
IMM GRANULOCYTES NFR BLD: 0 %
LDLC SERPL CALC-MCNC: 88 MG/DL
LYMPHOCYTES # BLD AUTO: 1.4 10E3/UL (ref 0.8–5.3)
LYMPHOCYTES NFR BLD AUTO: 23 %
MCH RBC QN AUTO: 29.9 PG (ref 26.5–33)
MCHC RBC AUTO-ENTMCNC: 33.2 G/DL (ref 31.5–36.5)
MCV RBC AUTO: 90 FL (ref 78–100)
MONOCYTES # BLD AUTO: 0.6 10E3/UL (ref 0–1.3)
MONOCYTES NFR BLD AUTO: 9 %
NEUTROPHILS # BLD AUTO: 3.8 10E3/UL (ref 1.6–8.3)
NEUTROPHILS NFR BLD AUTO: 63 %
NONHDLC SERPL-MCNC: 115 MG/DL
PLATELET # BLD AUTO: 255 10E3/UL (ref 150–450)
POTASSIUM SERPL-SCNC: 4.4 MMOL/L (ref 3.4–5.3)
PROT SERPL-MCNC: 6.8 G/DL (ref 6.4–8.3)
PSA SERPL DL<=0.01 NG/ML-MCNC: <0.01 NG/ML (ref 0–6.5)
RBC # BLD AUTO: 4.92 10E6/UL (ref 4.4–5.9)
SODIUM SERPL-SCNC: 144 MMOL/L (ref 136–145)
TRIGL SERPL-MCNC: 136 MG/DL
TSH SERPL DL<=0.005 MIU/L-ACNC: 2.69 UIU/ML (ref 0.3–4.2)
WBC # BLD AUTO: 6 10E3/UL (ref 4–11)

## 2023-09-20 PROCEDURE — 82306 VITAMIN D 25 HYDROXY: CPT | Performed by: FAMILY MEDICINE

## 2023-09-20 PROCEDURE — 84443 ASSAY THYROID STIM HORMONE: CPT | Performed by: FAMILY MEDICINE

## 2023-09-20 PROCEDURE — 83036 HEMOGLOBIN GLYCOSYLATED A1C: CPT | Performed by: FAMILY MEDICINE

## 2023-09-20 PROCEDURE — 80061 LIPID PANEL: CPT | Performed by: FAMILY MEDICINE

## 2023-09-20 PROCEDURE — 85025 COMPLETE CBC W/AUTO DIFF WBC: CPT | Performed by: FAMILY MEDICINE

## 2023-09-20 PROCEDURE — G0439 PPPS, SUBSEQ VISIT: HCPCS | Performed by: FAMILY MEDICINE

## 2023-09-20 PROCEDURE — G0103 PSA SCREENING: HCPCS | Performed by: FAMILY MEDICINE

## 2023-09-20 PROCEDURE — 36415 COLL VENOUS BLD VENIPUNCTURE: CPT | Performed by: FAMILY MEDICINE

## 2023-09-20 PROCEDURE — 80053 COMPREHEN METABOLIC PANEL: CPT | Performed by: FAMILY MEDICINE

## 2023-09-20 RX ORDER — LISINOPRIL 20 MG/1
TABLET ORAL
Qty: 90 TABLET | Refills: 3 | Status: SHIPPED | OUTPATIENT
Start: 2023-09-20 | End: 2024-09-11

## 2023-09-20 RX ORDER — AMLODIPINE BESYLATE 5 MG/1
TABLET ORAL
Qty: 90 TABLET | Refills: 3 | Status: SHIPPED | OUTPATIENT
Start: 2023-09-20 | End: 2024-09-11

## 2023-09-20 RX ORDER — ATORVASTATIN CALCIUM 10 MG/1
10 TABLET, FILM COATED ORAL DAILY
Qty: 90 TABLET | Refills: 3 | Status: SHIPPED | OUTPATIENT
Start: 2023-09-20 | End: 2024-09-11

## 2023-09-20 ASSESSMENT — ENCOUNTER SYMPTOMS
DIARRHEA: 0
COUGH: 0
NERVOUS/ANXIOUS: 0
HEMATURIA: 0
SHORTNESS OF BREATH: 0
JOINT SWELLING: 0
HEADACHES: 0
HEARTBURN: 0
ABDOMINAL PAIN: 0
EYE PAIN: 0
DYSURIA: 0
SORE THROAT: 0
FREQUENCY: 0
PARESTHESIAS: 0
DIZZINESS: 0
WEAKNESS: 0
NAUSEA: 0
FEVER: 0
HEMATOCHEZIA: 0
PALPITATIONS: 0
MYALGIAS: 0
ARTHRALGIAS: 0
CHILLS: 0
CONSTIPATION: 0

## 2023-09-20 ASSESSMENT — ACTIVITIES OF DAILY LIVING (ADL): CURRENT_FUNCTION: NO ASSISTANCE NEEDED

## 2023-09-20 NOTE — PROGRESS NOTES
"SUBJECTIVE:   Brian is a 73 year old who presents for Preventive Visit.      9/20/2023    11:48 AM   Additional Questions   Roomed by harleen bernabe       Are you in the first 12 months of your Medicare coverage?  No    Healthy Habits:     In general, how would you rate your overall health?  Excellent    Frequency of exercise:  2-3 days/week    Duration of exercise:  15-30 minutes    Do you usually eat at least 4 servings of fruit and vegetables a day, include whole grains    & fiber and avoid regularly eating high fat or \"junk\" foods?  Yes    Taking medications regularly:  Yes    Barriers to taking medications:  None    Medication side effects:  None    Ability to successfully perform activities of daily living:  No assistance needed    Home Safety:  No safety concerns identified    Hearing Impairment:  No hearing concerns    In the past 6 months, have you been bothered by leaking of urine?  No    In general, how would you rate your overall mental or emotional health?  Excellent    Additional concerns today:  No      Today's PHQ-2 Score:       9/19/2023     7:02 PM   PHQ-2 ( 1999 Pfizer)   Q1: Little interest or pleasure in doing things 0   Q2: Feeling down, depressed or hopeless 0   PHQ-2 Score 0   Q1: Little interest or pleasure in doing things Not at all   Q2: Feeling down, depressed or hopeless Not at all   PHQ-2 Score 0           Have you ever done Advance Care Planning? (For example, a Health Directive, POLST, or a discussion with a medical provider or your loved ones about your wishes): Yes, advance care planning is on file.       Fall risk  Fallen 2 or more times in the past year?: No  Any fall with injury in the past year?: No    Cognitive Screening   1) Repeat 3 items (Leader, Season, Table)    2) Clock draw: NORMAL  3) 3 item recall: Recalls 3 objects  Results: 3 items recalled: COGNITIVE IMPAIRMENT LESS LIKELY    Mini-CogTM Copyright S Jamie. Licensed by the author for use in [facility]; " reprinted with permission (soob@.[facility]). All rights reserved.      Do you have sleep apnea, excessive snoring or daytime drowsiness? : no    Reviewed and updated as needed this visit by clinical staff   Tobacco  Allergies               Reviewed and updated as needed this visit by Provider                 Social History     Tobacco Use     Smoking status: Former     Packs/day: 25.00     Types: Cigarettes     Quit date: 1997     Years since quittin.7     Smokeless tobacco: Never     Tobacco comments:     30 pack year history    Substance Use Topics     Alcohol use: Yes     Comment: rare             2023     2:12 PM   Alcohol Use   Prescreen: >3 drinks/day or >7 drinks/week? No     Do you have a current opioid prescription? No  Do you use any other controlled substances or medications that are not prescribed by a provider? None               Current providers sharing in care for this patient include:    Patient Care Team:  Amilcar Gaona MD as PCP - General  Amilcar Gaona MD as Assigned PCP    The following health maintenance items are reviewed in Epic and correct as of today:  Health Maintenance   Topic Date Due     ANNUAL REVIEW OF HM ORDERS  2022     COVID-19 Vaccine (6 - Pfizer series) 01/15/2023     INFLUENZA VACCINE (1) 2023     MEDICARE ANNUAL WELLNESS VISIT  10/03/2023     FALL RISK ASSESSMENT  2024     LIPID  10/03/2027     ADVANCE CARE PLANNING  10/03/2027     COLORECTAL CANCER SCREENING  2028     DTAP/TDAP/TD IMMUNIZATION (3 - Td or Tdap) 2028     HEPATITIS C SCREENING  Completed     PHQ-2 (once per calendar year)  Completed     Pneumococcal Vaccine: 65+ Years  Completed     ZOSTER IMMUNIZATION  Completed     AORTIC ANEURYSM SCREENING (SYSTEM ASSIGNED)  Completed     IPV IMMUNIZATION  Aged Out     HPV IMMUNIZATION  Aged Out     MENINGITIS IMMUNIZATION  Aged Out     LUNG CANCER SCREENING  Discontinued                Review of Systems   Constitutional:   "Negative for chills and fever.   HENT:  Negative for congestion, ear pain, hearing loss and sore throat.    Eyes:  Negative for pain.   Respiratory:  Negative for cough and shortness of breath.    Cardiovascular:  Negative for chest pain, palpitations and peripheral edema.   Gastrointestinal:  Negative for abdominal pain, constipation, diarrhea, heartburn, hematochezia and nausea.   Genitourinary:  Positive for impotence. Negative for dysuria, frequency, genital sores, hematuria, penile discharge and urgency.   Musculoskeletal:  Negative for arthralgias, joint swelling and myalgias.   Skin:  Negative for rash.   Neurological:  Negative for dizziness, weakness, headaches and paresthesias.   Psychiatric/Behavioral:  Negative for mood changes. The patient is not nervous/anxious.      Dx with macular  degeneration    Saw optometry, ophthalmalogist  On eye vitamin now    No procedure/  injection  needed    Vision fine    Some early cataracts    Just had colonoscopy done    Good for 5 years now    Retired for almost 5 years now    Some  walks      OBJECTIVE:   There were no vitals taken for this visit. Estimated body mass index is 30.02 kg/m  as calculated from the following:    Height as of 10/3/22: 1.74 m (5' 8.5\").    Weight as of 10/3/22: 90.9 kg (200 lb 6 oz).  Physical Exam  GENERAL: healthy, alert and no distress  EYES: Eyes grossly normal to inspection, PERRL and conjunctivae and sclerae normal  HENT: ear canals and TM's normal, nose and mouth without ulcers or lesions  NECK: no adenopathy, no asymmetry, masses, or scars and thyroid normal to palpation  RESP: lungs clear to auscultation - no rales, rhonchi or wheezes  CV: regular rate and rhythm, normal S1 S2, no S3 or S4, no murmur, click or rub, no peripheral edema and peripheral pulses strong  ABDOMEN: soft, nontender, no hepatosplenomegaly, no masses and bowel sounds normal  MS: no gross musculoskeletal defects noted, no edema  SKIN: no suspicious lesions or " rashes  NEURO: Normal strength and tone, mentation intact and speech normal  PSYCH: mentation appears normal, affect normal/bright    Diagnostic Test Results:  Labs reviewed in Epic    ASSESSMENT / PLAN:   Brian was seen today for wellness visit and health maintenance.    Diagnoses and all orders for this visit:    Encounter for Medicare annual wellness exam    Routine general medical examination at a health care facility    Essential hypertension with goal blood pressure less than 140/90  -     amLODIPine (NORVASC) 5 MG tablet; TAKE 1 TABLET(5 MG) BY MOUTH DAILY  -     lisinopril (ZESTRIL) 20 MG tablet; TAKE 1 TABLET(20 MG) BY MOUTH DAILY    Hyperlipidemia LDL goal <130  -     atorvastatin (LIPITOR) 10 MG tablet; Take 1 tablet (10 mg) by mouth daily  -     Lipid panel reflex to direct LDL Fasting; Future  -     Comprehensive metabolic panel; Future  -     Lipid panel reflex to direct LDL Fasting  -     Comprehensive metabolic panel    Vitamin D deficiency disease  -     Vitamin D Deficiency; Future  -     Vitamin D Deficiency    Impaired fasting glucose  -     Hemoglobin A1c; Future  -     Hemoglobin A1c    Screening for prostate cancer  -     Prostate Specific Antigen Screen; Future  -     Prostate Specific Antigen Screen    Fatigue, unspecified type  -     CBC with Platelets & Differential; Future  -     TSH with free T4 reflex; Future  -     CBC with Platelets & Differential  -     TSH with free T4 reflex    Other orders  -     Cancel: PRIMARY CARE FOLLOW-UP SCHEDULING; Future  -     Cancel: PRIMARY CARE FOLLOW-UP SCHEDULING; Future    Patient stable  Keep working on healthy diet/exercise and wt loss  Check labs   Blood pressure okay, refill meds  Refill chol med  Patient on over the counter vit D   Patient will see eye doctor regularly for monitoring of macular degeneration      Patient has been advised of split billing requirements and indicates understanding: Yes      COUNSELING:  Reviewed preventive health  "counseling, as reflected in patient instructions       Regular exercise       Healthy diet/nutrition       Vision screening       Dental care       Colon cancer screening       Prostate cancer screening      BMI:   Estimated body mass index is 30.02 kg/m  as calculated from the following:    Height as of 10/3/22: 1.74 m (5' 8.5\").    Weight as of 10/3/22: 90.9 kg (200 lb 6 oz).   Weight management plan: Discussed healthy diet and exercise guidelines      He reports that he quit smoking about 26 years ago. His smoking use included cigarettes. He smoked an average of 25 packs per day. He has never used smokeless tobacco.      Appropriate preventive services were discussed with this patient, including applicable screening as appropriate for cardiovascular disease, diabetes, osteopenia/osteoporosis, and glaucoma.  As appropriate for age/gender, discussed screening for colorectal cancer, prostate cancer, breast cancer, and cervical cancer. Checklist reviewing preventive services available has been given to the patient.    Reviewed patients plan of care and provided an AVS. The Basic Care Plan (routine screening as documented in Health Maintenance) for Brian meets the Care Plan requirement. This Care Plan has been established and reviewed with the Patient.          Amilcar Gaona MD  [facility]    Identified Health Risks:  I have reviewed Opioid Use Disorder and Substance Use Disorder risk factors and made any needed referrals.   "

## 2023-09-21 LAB — DEPRECATED CALCIDIOL+CALCIFEROL SERPL-MC: 49 UG/L (ref 20–75)

## 2023-09-21 NOTE — RESULT ENCOUNTER NOTE
These labs all look fine.    The low urea nitrogen is not worrisome.  Only worrisome if high.    Vitamin D is still pending.    Amilcar Gaona MD

## 2024-07-15 NOTE — NURSING NOTE
"Chief Complaint   Patient presents with     Pre-Op Exam     Health Maintenance       Initial /87 (BP Location: Right arm, Patient Position: Chair, Cuff Size: Adult Regular)  Pulse 68  Temp 98  F (36.7  C) (Oral)  Wt 208 lb (94.3 kg)  SpO2 96%  BMI 31.17 kg/m2 Estimated body mass index is 31.17 kg/(m^2) as calculated from the following:    Height as of 4/21/17: 5' 8.5\" (1.74 m).    Weight as of this encounter: 208 lb (94.3 kg).  Medication Reconciliation: complete   Kecia Lind CMA      "
Instructions: This plan will send the code FBSE to the PM system.  DO NOT or CHANGE the price.
Price (Do Not Change): 0.00
Detail Level: Simple

## 2024-09-08 SDOH — HEALTH STABILITY: PHYSICAL HEALTH: ON AVERAGE, HOW MANY DAYS PER WEEK DO YOU ENGAGE IN MODERATE TO STRENUOUS EXERCISE (LIKE A BRISK WALK)?: 5 DAYS

## 2024-09-11 ENCOUNTER — OFFICE VISIT (OUTPATIENT)
Dept: FAMILY MEDICINE | Facility: CLINIC | Age: 74
End: 2024-09-11
Payer: COMMERCIAL

## 2024-09-11 VITALS
TEMPERATURE: 97.5 F | SYSTOLIC BLOOD PRESSURE: 138 MMHG | OXYGEN SATURATION: 97 % | DIASTOLIC BLOOD PRESSURE: 77 MMHG | RESPIRATION RATE: 14 BRPM | BODY MASS INDEX: 29.33 KG/M2 | WEIGHT: 193.5 LBS | HEART RATE: 58 BPM | HEIGHT: 68 IN

## 2024-09-11 DIAGNOSIS — E78.5 HYPERLIPIDEMIA LDL GOAL <100: ICD-10-CM

## 2024-09-11 DIAGNOSIS — R53.83 FATIGUE, UNSPECIFIED TYPE: ICD-10-CM

## 2024-09-11 DIAGNOSIS — Z12.5 SCREENING FOR PROSTATE CANCER: ICD-10-CM

## 2024-09-11 DIAGNOSIS — I10 ESSENTIAL HYPERTENSION WITH GOAL BLOOD PRESSURE LESS THAN 140/90: ICD-10-CM

## 2024-09-11 DIAGNOSIS — Z00.00 ROUTINE GENERAL MEDICAL EXAMINATION AT A HEALTH CARE FACILITY: ICD-10-CM

## 2024-09-11 DIAGNOSIS — Z00.00 ENCOUNTER FOR MEDICARE ANNUAL WELLNESS EXAM: Primary | ICD-10-CM

## 2024-09-11 DIAGNOSIS — E55.9 VITAMIN D DEFICIENCY DISEASE: ICD-10-CM

## 2024-09-11 DIAGNOSIS — R73.01 IMPAIRED FASTING GLUCOSE: ICD-10-CM

## 2024-09-11 LAB
BASOPHILS # BLD AUTO: 0.1 10E3/UL (ref 0–0.2)
BASOPHILS NFR BLD AUTO: 1 %
EOSINOPHIL # BLD AUTO: 0.2 10E3/UL (ref 0–0.7)
EOSINOPHIL NFR BLD AUTO: 3 %
ERYTHROCYTE [DISTWIDTH] IN BLOOD BY AUTOMATED COUNT: 13.1 % (ref 10–15)
HBA1C MFR BLD: 5.5 % (ref 0–5.6)
HCT VFR BLD AUTO: 44.7 % (ref 40–53)
HGB BLD-MCNC: 15.2 G/DL (ref 13.3–17.7)
IMM GRANULOCYTES # BLD: 0 10E3/UL
IMM GRANULOCYTES NFR BLD: 0 %
LYMPHOCYTES # BLD AUTO: 1.6 10E3/UL (ref 0.8–5.3)
LYMPHOCYTES NFR BLD AUTO: 24 %
MCH RBC QN AUTO: 30.8 PG (ref 26.5–33)
MCHC RBC AUTO-ENTMCNC: 34 G/DL (ref 31.5–36.5)
MCV RBC AUTO: 91 FL (ref 78–100)
MONOCYTES # BLD AUTO: 0.6 10E3/UL (ref 0–1.3)
MONOCYTES NFR BLD AUTO: 8 %
NEUTROPHILS # BLD AUTO: 4.4 10E3/UL (ref 1.6–8.3)
NEUTROPHILS NFR BLD AUTO: 64 %
PLATELET # BLD AUTO: 214 10E3/UL (ref 150–450)
RBC # BLD AUTO: 4.93 10E6/UL (ref 4.4–5.9)
WBC # BLD AUTO: 6.8 10E3/UL (ref 4–11)

## 2024-09-11 PROCEDURE — 85025 COMPLETE CBC W/AUTO DIFF WBC: CPT | Performed by: FAMILY MEDICINE

## 2024-09-11 PROCEDURE — 80053 COMPREHEN METABOLIC PANEL: CPT | Performed by: FAMILY MEDICINE

## 2024-09-11 PROCEDURE — G0439 PPPS, SUBSEQ VISIT: HCPCS | Performed by: FAMILY MEDICINE

## 2024-09-11 PROCEDURE — 82306 VITAMIN D 25 HYDROXY: CPT | Performed by: FAMILY MEDICINE

## 2024-09-11 PROCEDURE — 84443 ASSAY THYROID STIM HORMONE: CPT | Performed by: FAMILY MEDICINE

## 2024-09-11 PROCEDURE — 80061 LIPID PANEL: CPT | Performed by: FAMILY MEDICINE

## 2024-09-11 PROCEDURE — 36415 COLL VENOUS BLD VENIPUNCTURE: CPT | Performed by: FAMILY MEDICINE

## 2024-09-11 PROCEDURE — 83036 HEMOGLOBIN GLYCOSYLATED A1C: CPT | Performed by: FAMILY MEDICINE

## 2024-09-11 PROCEDURE — G0103 PSA SCREENING: HCPCS | Performed by: FAMILY MEDICINE

## 2024-09-11 RX ORDER — LISINOPRIL 20 MG/1
TABLET ORAL
Qty: 90 TABLET | Refills: 3 | Status: SHIPPED | OUTPATIENT
Start: 2024-09-11

## 2024-09-11 RX ORDER — ATORVASTATIN CALCIUM 10 MG/1
10 TABLET, FILM COATED ORAL DAILY
Qty: 90 TABLET | Refills: 3 | Status: SHIPPED | OUTPATIENT
Start: 2024-09-11

## 2024-09-11 RX ORDER — AMLODIPINE BESYLATE 5 MG/1
TABLET ORAL
Qty: 90 TABLET | Refills: 3 | Status: SHIPPED | OUTPATIENT
Start: 2024-09-11

## 2024-09-11 ASSESSMENT — PAIN SCALES - GENERAL: PAINLEVEL: NO PAIN (0)

## 2024-09-11 NOTE — PATIENT INSTRUCTIONS
We will send you lab results    Could get covid/ flu shots at pharmacy     Continue same meds                Patient Education   Preventive Care Advice   This is general advice given by our system to help you stay healthy. However, your care team may have specific advice just for you. Please talk to your care team about your preventive care needs.  Nutrition  Eat 5 or more servings of fruits and vegetables each day.  Try wheat bread, brown rice and whole grain pasta (instead of white bread, rice, and pasta).  Get enough calcium and vitamin D. Check the label on foods and aim for 100% of the RDA (recommended daily allowance).  Lifestyle  Exercise at least 150 minutes each week  (30 minutes a day, 5 days a week).  Do muscle strengthening activities 2 days a week. These help control your weight and prevent disease.  No smoking.  Wear sunscreen to prevent skin cancer.  Have a dental exam and cleaning every 6 months.  Yearly exams  See your health care team every year to talk about:  Any changes in your health.  Any medicines your care team has prescribed.  Preventive care, family planning, and ways to prevent chronic diseases.  Shots (vaccines)   HPV shots (up to age 26), if you've never had them before.  Hepatitis B shots (up to age 59), if you've never had them before.  COVID-19 shot: Get this shot when it's due.  Flu shot: Get a flu shot every year.  Tetanus shot: Get a tetanus shot every 10 years.  Pneumococcal, hepatitis A, and RSV shots: Ask your care team if you need these based on your risk.  Shingles shot (for age 50 and up)  General health tests  Diabetes screening:  Starting at age 35, Get screened for diabetes at least every 3 years.  If you are younger than age 35, ask your care team if you should be screened for diabetes.  Cholesterol test: At age 39, start having a cholesterol test every 5 years, or more often if advised.  Bone density scan (DEXA): At age 50, ask your care team if you should have this  scan for osteoporosis (brittle bones).  Hepatitis C: Get tested at least once in your life.  STIs (sexually transmitted infections)  Before age 24: Ask your care team if you should be screened for STIs.  After age 24: Get screened for STIs if you're at risk. You are at risk for STIs (including HIV) if:  You are sexually active with more than one person.  You don't use condoms every time.  You or a partner was diagnosed with a sexually transmitted infection.  If you are at risk for HIV, ask about PrEP medicine to prevent HIV.  Get tested for HIV at least once in your life, whether you are at risk for HIV or not.  Cancer screening tests  Cervical cancer screening: If you have a cervix, begin getting regular cervical cancer screening tests starting at age 21.  Breast cancer scan (mammogram): If you've ever had breasts, begin having regular mammograms starting at age 40. This is a scan to check for breast cancer.  Colon cancer screening: It is important to start screening for colon cancer at age 45.  Have a colonoscopy test every 10 years (or more often if you're at risk) Or, ask your provider about stool tests like a FIT test every year or Cologuard test every 3 years.  To learn more about your testing options, visit:   .  For help making a decision, visit:   https://bit.ly/nv17070.  Prostate cancer screening test: If you have a prostate, ask your care team if a prostate cancer screening test (PSA) at age 55 is right for you.  Lung cancer screening: If you are a current or former smoker ages 50 to 80, ask your care team if ongoing lung cancer screenings are right for you.  For informational purposes only. Not to replace the advice of your health care provider. Copyright   2023 [employer]. All rights reserved. Clinically reviewed by the [facility]. [employer] 544463 - REV 01/24.

## 2024-09-11 NOTE — PROGRESS NOTES
Preventive Care Visit  [facility] MD, Family Medicine  Sep 11, 2024          Jamaal Torres is a 74 year old, presenting for the following:  Wellness Visit        9/11/2024     1:54 PM   Additional Questions   Roomed by Kecia Lind         Health Care Directive  Patient does not have a Health Care Directive or Living Will: Advance Directive received and scanned. Click on Code in the patient header to view.    HPI        9/8/2024   General Health   How would you rate your overall physical health? Good   Feel stress (tense, anxious, or unable to sleep) Not at all            9/8/2024   Nutrition   Diet: Regular (no restrictions)            9/8/2024   Exercise   Days per week of moderate/strenous exercise 5 days   Average minutes spent exercising at this level 30 min            9/8/2024   Social Factors   Frequency of gathering with friends or relatives Patient declined   Worry food won't last until get money to buy more No   Food not last or not have enough money for food? No   Do you have housing? (Housing is defined as stable permanent housing and does not include staying ouside in a car, in a tent, in an abandoned building, in an overnight shelter, or couch-surfing.) Yes   Are you worried about losing your housing? No   Lack of transportation? No   Unable to get utilities (heat,electricity)? No            9/8/2024   Fall Risk   Fallen 2 or more times in the past year? No   Trouble with walking or balance? No             9/8/2024   Activities of Daily Living- Home Safety   Needs help with the following daily activites None of the above   Safety concerns in the home None of the above            9/8/2024   Dental   Dentist two times every year? Yes            9/8/2024   Hearing Screening   Hearing concerns? None of the above            9/8/2024   Driving Risk Screening   Patient/family members have concerns about driving No            9/8/2024   General Alertness/Fatigue  Screening   Have you been more tired than usual lately? No            2024   Urinary Incontinence Screening   Bothered by leaking urine in past 6 months No            2024   TB Screening   Were you born outside of the [place]? No            Today's PHQ-2 Score:       2024     1:42 PM   PHQ-2 (  [employer])   Q1: Little interest or pleasure in doing things 0   Q2: Feeling down, depressed or hopeless 0   PHQ-2 Score 0   Q1: Little interest or pleasure in doing things Not at all   Q2: Feeling down, depressed or hopeless Not at all   PHQ-2 Score 0           2024   Substance Use   Alcohol more than 3/day or more than 7/wk No   Do you have a current opioid prescription? No   How severe/bad is pain from 1 to 10? 0/10 (No Pain)   Do you use any other substances recreationally? No        Social History     Tobacco Use    Smoking status: Former     Current packs/day: 0.00     Types: Cigarettes     Quit date: 1997     Years since quittin.7     Passive exposure: Never    Smokeless tobacco: Never    Tobacco comments:     30 pack year history    Vaping Use    Vaping status: Never Used   Substance Use Topics    Alcohol use: Yes     Comment: rare    Drug use: No       ASCVD Risk   The 10-year ASCVD risk score (Beverly DK, et al., 2019) is: 25.9%    Values used to calculate the score:      Age: 74 years      Sex: Male      Is Non- : No      Diabetic: No      Tobacco smoker: No      Systolic Blood Pressure: 138 mmHg      Is BP treated: Yes      HDL Cholesterol: 54 mg/dL      Total Cholesterol: 169 mg/dL            Reviewed and updated as needed this visit by Provider                      Current providers sharing in care for this patient include:  Patient Care Team:  Amilcar Gaona MD as PCP - General (Family Medicine)  Amilcar Gaona MD as Assigned PCP    The following health maintenance items are reviewed in Epic and correct as of today:  Health Maintenance   Topic Date Due  "   ANNUAL REVIEW OF HM ORDERS  09/24/2022    INFLUENZA VACCINE (1) 09/01/2024    COVID-19 Vaccine (7 - 2023-24 season) 09/01/2024    MEDICARE ANNUAL WELLNESS VISIT  09/20/2024    LIPID  09/20/2024    FALL RISK ASSESSMENT  09/11/2025    RSV VACCINE (1 - 1-dose 75+ series) 02/02/2026    GLUCOSE  09/20/2026    COLORECTAL CANCER SCREENING  09/14/2028    ADVANCE CARE PLANNING  09/20/2028    DTAP/TDAP/TD IMMUNIZATION (3 - Td or Tdap) 09/24/2028    HEPATITIS C SCREENING  Completed    PHQ-2 (once per calendar year)  Completed    Pneumococcal Vaccine: 65+ Years  Completed    ZOSTER IMMUNIZATION  Completed    AORTIC ANEURYSM SCREENING (SYSTEM ASSIGNED)  Completed    HPV IMMUNIZATION  Aged Out    MENINGITIS IMMUNIZATION  Aged Out    RSV MONOCLONAL ANTIBODY  Aged Out    LUNG CANCER SCREENING  Discontinued       Walk 1/2 hour 4-5 x per week     Would like to get wt down further     Highest was 215    Goal is 185    Sleep well     Up to date colonoscopy         Objective    Exam  /77 (BP Location: Right arm, Patient Position: Chair, Cuff Size: Adult Regular)   Pulse 58   Temp 97.5  F (36.4  C) (Temporal)   Resp 14   Ht 1.727 m (5' 8\")   Wt 87.8 kg (193 lb 8 oz)   SpO2 97%   BMI 29.42 kg/m     Estimated body mass index is 29.42 kg/m  as calculated from the following:    Height as of this encounter: 1.727 m (5' 8\").    Weight as of this encounter: 87.8 kg (193 lb 8 oz).    Physical Exam  GENERAL: alert and no distress  EYES: Eyes grossly normal to inspection, PERRL and conjunctivae and sclerae normal  HENT: ear canals and TM's normal, nose and mouth without ulcers or lesions  NECK: no adenopathy, no asymmetry, masses, or scars  RESP: lungs clear to auscultation - no rales, rhonchi or wheezes  CV: regular rate and rhythm, normal S1 S2, no S3 or S4, no murmur, click or rub, no peripheral edema  ABDOMEN: soft, nontender, no hepatosplenomegaly, no masses and bowel sounds normal  MS: no gross musculoskeletal defects noted, " no edema  SKIN: no suspicious lesions or rashes  NEURO: Normal strength and tone, mentation intact and speech normal  PSYCH: mentation appears normal, affect normal/bright        9/11/2024   Mini Cog   Clock Draw Score 2 Normal   3 Item Recall 2 objects recalled   Mini Cog Total Score 4                   1. Encounter for Medicare annual wellness exam    2. Routine general medical examination at a health care facility    3. Essential hypertension with goal blood pressure less than 140/90    4. Hyperlipidemia LDL goal <100    5. Vitamin D deficiency disease    6. Fatigue, unspecified type    7. Screening for prostate cancer    8. Impaired fasting glucose      Overall patient stable  Keep working on healthy diet/exercise and wt loss  Refill meds  Check labs   Up to date colonoscopy  Past history of prostate cancer   Can get covid and/ or flu shots at pharmacy this fall              Signed Electronically by: Amilcar Gaona MD

## 2024-09-12 LAB
ALBUMIN SERPL BCG-MCNC: 4.3 G/DL (ref 3.5–5.2)
ALP SERPL-CCNC: 88 U/L (ref 40–150)
ALT SERPL W P-5'-P-CCNC: 17 U/L (ref 0–70)
ANION GAP SERPL CALCULATED.3IONS-SCNC: 10 MMOL/L (ref 7–15)
AST SERPL W P-5'-P-CCNC: 24 U/L (ref 0–45)
BILIRUB SERPL-MCNC: 1.2 MG/DL
BUN SERPL-MCNC: 8.2 MG/DL (ref 8–23)
CALCIUM SERPL-MCNC: 8.9 MG/DL (ref 8.8–10.4)
CHLORIDE SERPL-SCNC: 105 MMOL/L (ref 98–107)
CHOLEST SERPL-MCNC: 167 MG/DL
CREAT SERPL-MCNC: 0.8 MG/DL (ref 0.67–1.17)
EGFRCR SERPLBLD CKD-EPI 2021: >90 ML/MIN/1.73M2
FASTING STATUS PATIENT QL REPORTED: YES
FASTING STATUS PATIENT QL REPORTED: YES
GLUCOSE SERPL-MCNC: 94 MG/DL (ref 70–99)
HCO3 SERPL-SCNC: 25 MMOL/L (ref 22–29)
HDLC SERPL-MCNC: 51 MG/DL
LDLC SERPL CALC-MCNC: 86 MG/DL
NONHDLC SERPL-MCNC: 116 MG/DL
POTASSIUM SERPL-SCNC: 4.2 MMOL/L (ref 3.4–5.3)
PROT SERPL-MCNC: 6.8 G/DL (ref 6.4–8.3)
PSA SERPL DL<=0.01 NG/ML-MCNC: <0.01 NG/ML (ref 0–6.5)
SODIUM SERPL-SCNC: 140 MMOL/L (ref 135–145)
TRIGL SERPL-MCNC: 150 MG/DL
TSH SERPL DL<=0.005 MIU/L-ACNC: 2.96 UIU/ML (ref 0.3–4.2)
VIT D+METAB SERPL-MCNC: 52 NG/ML (ref 20–50)

## 2024-09-12 NOTE — RESULT ENCOUNTER NOTE
Vitamin D is just barely high based in the new stricter normal range.  You could back off a little on dose of over the counter vitamin D.    Other labs are fine.     Amilcar Gaona MD

## 2025-07-15 PROBLEM — C61 PROSTATE CANCER (H): Status: RESOLVED | Noted: 2017-09-22 | Resolved: 2025-07-15

## 2025-07-15 PROBLEM — Z90.79 HISTORY OF PROSTATECTOMY: Status: RESOLVED | Noted: 2018-03-05 | Resolved: 2025-07-15

## 2025-08-08 ENCOUNTER — TRANSFERRED RECORDS (OUTPATIENT)
Dept: HEALTH INFORMATION MANAGEMENT | Facility: CLINIC | Age: 75
End: 2025-08-08
Payer: COMMERCIAL